# Patient Record
Sex: MALE | Race: WHITE | Employment: OTHER | URBAN - NONMETROPOLITAN AREA
[De-identification: names, ages, dates, MRNs, and addresses within clinical notes are randomized per-mention and may not be internally consistent; named-entity substitution may affect disease eponyms.]

---

## 2024-06-25 ENCOUNTER — HOSPITAL ENCOUNTER (INPATIENT)
Age: 24
LOS: 6 days | Discharge: HOME | DRG: 885 | End: 2024-07-01
Attending: PEDIATRICS | Admitting: PSYCHIATRY & NEUROLOGY
Payer: MEDICAID

## 2024-06-25 DIAGNOSIS — R45.851 SUICIDAL IDEATION: Primary | ICD-10-CM

## 2024-06-25 LAB
ALBUMIN SERPL-MCNC: 5.3 G/DL (ref 3.5–5.2)
ALP SERPL-CCNC: 74 U/L (ref 40–130)
ALT SERPL-CCNC: <5 U/L (ref 5–41)
AMPHET UR QL SCN: NEGATIVE
ANION GAP SERPL CALCULATED.3IONS-SCNC: 17 MMOL/L (ref 7–19)
AST SERPL-CCNC: 19 U/L (ref 5–40)
BARBITURATES UR QL SCN: NEGATIVE
BASOPHILS # BLD: 0 K/UL (ref 0–0.2)
BASOPHILS NFR BLD: 0.3 % (ref 0–1)
BENZODIAZ UR QL SCN: NEGATIVE
BILIRUB SERPL-MCNC: 0.9 MG/DL (ref 0.2–1.2)
BUN SERPL-MCNC: 12 MG/DL (ref 6–20)
BUPRENORPHINE URINE: NEGATIVE
CALCIUM SERPL-MCNC: 9.5 MG/DL (ref 8.6–10)
CANNABINOIDS UR QL SCN: NEGATIVE
CHLORIDE SERPL-SCNC: 101 MMOL/L (ref 98–111)
CO2 SERPL-SCNC: 22 MMOL/L (ref 22–29)
COCAINE UR QL SCN: NEGATIVE
CREAT SERPL-MCNC: 0.8 MG/DL (ref 0.5–1.2)
DRUG SCREEN COMMENT UR-IMP: NORMAL
EOSINOPHIL # BLD: 0 K/UL (ref 0–0.6)
EOSINOPHIL NFR BLD: 0 % (ref 0–5)
ERYTHROCYTE [DISTWIDTH] IN BLOOD BY AUTOMATED COUNT: 11.6 % (ref 11.5–14.5)
ETHANOLAMINE SERPL-MCNC: <10 MG/DL (ref 0–0.08)
FENTANYL SCREEN, URINE: NEGATIVE
GLUCOSE SERPL-MCNC: 92 MG/DL (ref 74–109)
HCT VFR BLD AUTO: 48.7 % (ref 42–52)
HGB BLD-MCNC: 16.8 G/DL (ref 14–18)
IMM GRANULOCYTES # BLD: 0 K/UL
LYMPHOCYTES # BLD: 0.9 K/UL (ref 1.1–4.5)
LYMPHOCYTES NFR BLD: 11 % (ref 20–40)
MCH RBC QN AUTO: 30.8 PG (ref 27–31)
MCHC RBC AUTO-ENTMCNC: 34.5 G/DL (ref 33–37)
MCV RBC AUTO: 89.2 FL (ref 80–94)
METHADONE UR QL SCN: NEGATIVE
METHAMPHETAMINE, URINE: NEGATIVE
MONOCYTES # BLD: 0.3 K/UL (ref 0–0.9)
MONOCYTES NFR BLD: 4.3 % (ref 0–10)
NEUTROPHILS # BLD: 6.6 K/UL (ref 1.5–7.5)
NEUTS SEG NFR BLD: 84.1 % (ref 50–65)
OPIATES UR QL SCN: NEGATIVE
OXYCODONE UR QL SCN: NEGATIVE
PCP UR QL SCN: NEGATIVE
PLATELET # BLD AUTO: 241 K/UL (ref 130–400)
PMV BLD AUTO: 9.7 FL (ref 9.4–12.4)
POTASSIUM SERPL-SCNC: 3.8 MMOL/L (ref 3.5–5)
PROT SERPL-MCNC: 8.1 G/DL (ref 6.6–8.7)
RBC # BLD AUTO: 5.46 M/UL (ref 4.7–6.1)
SARS-COV-2 RDRP RESP QL NAA+PROBE: NOT DETECTED
SODIUM SERPL-SCNC: 140 MMOL/L (ref 136–145)
TRICYCLIC ANTIDEPRESSANTS, UR: NEGATIVE
WBC # BLD AUTO: 7.9 K/UL (ref 4.8–10.8)

## 2024-06-25 PROCEDURE — 80053 COMPREHEN METABOLIC PANEL: CPT

## 2024-06-25 PROCEDURE — 36415 COLL VENOUS BLD VENIPUNCTURE: CPT

## 2024-06-25 PROCEDURE — 99285 EMERGENCY DEPT VISIT HI MDM: CPT

## 2024-06-25 PROCEDURE — 80307 DRUG TEST PRSMV CHEM ANLYZR: CPT

## 2024-06-25 PROCEDURE — 82077 ASSAY SPEC XCP UR&BREATH IA: CPT

## 2024-06-25 PROCEDURE — G0480 DRUG TEST DEF 1-7 CLASSES: HCPCS

## 2024-06-25 PROCEDURE — 87635 SARS-COV-2 COVID-19 AMP PRB: CPT

## 2024-06-25 PROCEDURE — 85025 COMPLETE CBC W/AUTO DIFF WBC: CPT

## 2024-06-25 PROCEDURE — 1240000000 HC EMOTIONAL WELLNESS R&B

## 2024-06-25 PROCEDURE — 6370000000 HC RX 637 (ALT 250 FOR IP): Performed by: PSYCHIATRY & NEUROLOGY

## 2024-06-25 RX ORDER — ACETAMINOPHEN 325 MG/1
650 TABLET ORAL EVERY 4 HOURS PRN
Status: DISCONTINUED | OUTPATIENT
Start: 2024-06-25 | End: 2024-07-01 | Stop reason: HOSPADM

## 2024-06-25 RX ORDER — TRAZODONE HYDROCHLORIDE 50 MG/1
50 TABLET ORAL NIGHTLY
Status: DISCONTINUED | OUTPATIENT
Start: 2024-06-25 | End: 2024-06-28

## 2024-06-25 RX ORDER — POLYETHYLENE GLYCOL 3350 17 G/17G
17 POWDER, FOR SOLUTION ORAL DAILY PRN
Status: DISCONTINUED | OUTPATIENT
Start: 2024-06-25 | End: 2024-07-01 | Stop reason: HOSPADM

## 2024-06-25 RX ORDER — MECOBALAMIN 5000 MCG
5 TABLET,DISINTEGRATING ORAL NIGHTLY
Status: DISCONTINUED | OUTPATIENT
Start: 2024-06-25 | End: 2024-06-29

## 2024-06-25 RX ORDER — HYDROXYZINE HYDROCHLORIDE 25 MG/1
25 TABLET, FILM COATED ORAL 3 TIMES DAILY PRN
Status: DISCONTINUED | OUTPATIENT
Start: 2024-06-25 | End: 2024-07-01 | Stop reason: HOSPADM

## 2024-06-25 RX ADMIN — Medication 5 MG: at 21:19

## 2024-06-25 RX ADMIN — HYDROXYZINE HYDROCHLORIDE 25 MG: 25 TABLET ORAL at 21:19

## 2024-06-25 SDOH — ECONOMIC STABILITY: FOOD INSECURITY: WITHIN THE PAST 12 MONTHS, YOU WORRIED THAT YOUR FOOD WOULD RUN OUT BEFORE YOU GOT MONEY TO BUY MORE.: NEVER TRUE

## 2024-06-25 SDOH — ECONOMIC STABILITY: INCOME INSECURITY: HOW HARD IS IT FOR YOU TO PAY FOR THE VERY BASICS LIKE FOOD, HOUSING, MEDICAL CARE, AND HEATING?: NOT HARD AT ALL

## 2024-06-25 SDOH — ECONOMIC STABILITY: INCOME INSECURITY: IN THE PAST 12 MONTHS, HAS THE ELECTRIC, GAS, OIL, OR WATER COMPANY THREATENED TO SHUT OFF SERVICE IN YOUR HOME?: NO

## 2024-06-25 ASSESSMENT — SLEEP AND FATIGUE QUESTIONNAIRES
AVERAGE NUMBER OF SLEEP HOURS: 9
DO YOU HAVE DIFFICULTY SLEEPING: NO
DO YOU USE A SLEEP AID: NO

## 2024-06-25 ASSESSMENT — PATIENT HEALTH QUESTIONNAIRE - PHQ9
SUM OF ALL RESPONSES TO PHQ QUESTIONS 1-9: 1
SUM OF ALL RESPONSES TO PHQ9 QUESTIONS 1 & 2: 1
SUM OF ALL RESPONSES TO PHQ QUESTIONS 1-9: 1
2. FEELING DOWN, DEPRESSED OR HOPELESS: SEVERAL DAYS
1. LITTLE INTEREST OR PLEASURE IN DOING THINGS: NOT AT ALL
SUM OF ALL RESPONSES TO PHQ QUESTIONS 1-9: 1
2. FEELING DOWN, DEPRESSED OR HOPELESS: SEVERAL DAYS
SUM OF ALL RESPONSES TO PHQ QUESTIONS 1-9: 1
SUM OF ALL RESPONSES TO PHQ9 QUESTIONS 1 & 2: 1
SUM OF ALL RESPONSES TO PHQ QUESTIONS 1-9: 1
SUM OF ALL RESPONSES TO PHQ9 QUESTIONS 1 & 2: 1
SUM OF ALL RESPONSES TO PHQ QUESTIONS 1-9: 1
SUM OF ALL RESPONSES TO PHQ QUESTIONS 1-9: 1
2. FEELING DOWN, DEPRESSED OR HOPELESS: SEVERAL DAYS
1. LITTLE INTEREST OR PLEASURE IN DOING THINGS: NOT AT ALL
1. LITTLE INTEREST OR PLEASURE IN DOING THINGS: NOT AT ALL
SUM OF ALL RESPONSES TO PHQ QUESTIONS 1-9: 1
SUM OF ALL RESPONSES TO PHQ QUESTIONS 1-9: 1

## 2024-06-25 ASSESSMENT — LIFESTYLE VARIABLES
HOW MANY STANDARD DRINKS CONTAINING ALCOHOL DO YOU HAVE ON A TYPICAL DAY: PATIENT DOES NOT DRINK
HOW OFTEN DO YOU HAVE A DRINK CONTAINING ALCOHOL: NEVER

## 2024-06-25 NOTE — ED NOTES
ED TO INPATIENT SBAR HANDOFF    Patient Name: Yves Haile   : 2000  23 y.o.   Family/Caregiver Present: Yes  Code Status Order: No Order    C-SSRS: Risk of Suicide: Low Risk  Sitter Yes  Restraints:         Situation  Chief Complaint:   Chief Complaint   Patient presents with    Suicidal     Spiritual struggle, suicidal thoughts 3 days ago     Patient Diagnosis: No admission diagnoses are documented for this encounter.     Brief Description of Patient's Condition:  PT states reason for ED visit, \"I've been suffering from anxiety and worry over the past few days. 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 I felt hot and flushed in my face all day.\" Pt reports suicidal thoughts \"sometimes\" and thoughts about his brother and making deals on his soul. Pt is religiously preoccupied and having paranoid delusions. Pt denies AVH. Pt denies sleeping difficulty and has had decreased appetite.Pt has been diagnosed with Autism. He started to see a therapist in [place] a few months ago and is suppose to be returning to [place] next week. Pt is on no medications at this time. Pt has never had any past suicide attempts. Pt has never been inpt before for mental health reasons.Pt has never seen a Psychiatrist. Pt denies any alcohol or drug use. Pt lives with his mother, step dad and brother in [place]. Pt is unemployed.   Per step mom he told her today he wanted to shoot himself. He struggles with his emotions. He thinks he does something bad all the time.The reason the pt came here was to visit and because  he was struggling at home with the family. Pt is suppose to return to [place] on . Step mom is concerned for his safety.     Mental Status: oriented, alert, coherent, logical, thought processes intact, and able to concentrate and follow conversation  Arrived from: home    Imaging:   No orders to display     COVID-19 Results:   Internal Administration   First Dose      Second Dose

## 2024-06-26 PROCEDURE — 1240000000 HC EMOTIONAL WELLNESS R&B

## 2024-06-26 PROCEDURE — 6370000000 HC RX 637 (ALT 250 FOR IP): Performed by: PSYCHIATRY & NEUROLOGY

## 2024-06-26 PROCEDURE — 90792 PSYCH DIAG EVAL W/MED SRVCS: CPT | Performed by: NURSE PRACTITIONER

## 2024-06-26 RX ADMIN — HYDROXYZINE HYDROCHLORIDE 25 MG: 25 TABLET ORAL at 20:55

## 2024-06-26 NOTE — ACP (ADVANCE CARE PLANNING)
Advance Care Planning     Advance Care Planning Inpatient Note  [facility] Department    Today's Date: 6/26/2024  Unit: [facility]ULT [facility]    Received request from IDT Member.  Upon review of chart and communication with care team, patient's decision making abilities are not in question.. Patient was/were present in the room during visit.    Goals of ACP Conversation:  Discuss advance care planning documents  Facilitate a discussion related to patient's goals of care as they align with the patient's values and beliefs.    Health Care Decision Makers:       Primary Decision Maker: KRZYSZTOF PIEDRA - [facility] - [phone]  Summary:  Documented Next of Kin, per patient report    Advance Care Planning Documents (Patient Wishes):  None     Assessment:  Patient stated who his support system; actually mentioned her step mother; still he understands what the State Law of [place] requires.  He appeared more calm and relaxed at the end of the conversation.      Interventions:  Provided education on documents for clarity and greater understanding  Discussed and provided education on state decision maker hierarchy    Care Preferences Communicated:   No    Outcomes/Plan:  Designation of Health Care Decision Maker based on [place] State Law    Electronically signed by MARLEEN Lynn on 6/26/2024 at 4:00 PM

## 2024-06-26 NOTE — PSYCHOTHERAPY
Group Therapy Note    Date: 6/26/2024  Start Time: 1330  End Time:  1400  Number of Participants: 10    Type of Group: SPIRITUALITY    Wellness Binder Information  Module Name:  Mindfulness  Session Number:      Patient's Goal:  To rest the mind    Notes:      Status After Intervention:  Improved    Participation Level: Active Listener    Participation Quality: Appropriate, Attentive, and Sharing      Speech:  normal      Thought Process/Content:       Affective Functioning: Congruent      Mood: Calm      Level of consciousness:  Attentive      Response to Learning: Able to verbalize current knowledge/experience, Able to verbalize/acknowledge new learning, and Capable of insight      Endings:     Modes of Intervention: Education, Socialization, and Activity      Discipline Responsible:       Signature:  Bruce Capone MA BCC

## 2024-06-26 NOTE — ED PROVIDER NOTES
[facility] 6 ADULT [facility]  eMERGENCY dEPARTMENT eNCOUnter      Pt Name: Yves Haile  MRN: [id number]  Birthdate 2000  Date of evaluation: 6/25/2024  Provider: Connie Hicks MD    CHIEF COMPLAINT       Chief Complaint   Patient presents with    Suicidal     Spiritual struggle, suicidal thoughts 3 days ago         HISTORY OF PRESENT ILLNESS   (Location/Symptom, Timing/Onset,Context/Setting, Quality, Duration, Modifying Factors, Severity)  Note limiting factors.   Yves Haile is a 23 y.o. male with history of autism who presents to the emergency department with recent suicidal thoughts.  Patient states he has been anxious and depressed.  He has been struggling \"spiritually.\"  Patient states that he began having suicidal thoughts \"a few days ago.\"  Patient states that \"I keep worrying about what might happen.\"  Patient denies tobacco use or drug use.  He occasionally uses alcohol.  Patient began seeing a therapist in [place] recently.  Patient is not on any medications.      HPI    NursingNotes were reviewed.    REVIEW OF SYSTEMS    (2-9 systems for level 4, 10 or more for level 5)     Review of Systems   Psychiatric/Behavioral:  Positive for suicidal ideas. Negative for hallucinations. The patient is nervous/anxious.    All other systems reviewed and are negative.           PAST MEDICALHISTORY   History reviewed. No pertinent past medical history.      SURGICAL HISTORY     History reviewed. No pertinent surgical history.      CURRENT MEDICATIONS   There are no discharge medications for this patient.      ALLERGIES     Patient has no known allergies.    FAMILY HISTORY     History reviewed. No pertinent family history.       SOCIAL HISTORY       Social History     Socioeconomic History    Marital status: Single     Spouse name: None    Number of children: None    Years of education: None    Highest education level: None   Tobacco Use    Smoking status: Never    Smokeless tobacco: Never   Substance and Sexual Activity

## 2024-06-26 NOTE — DISCHARGE INSTRUCTIONS
[facility]   Financial Resources*      Crisis Resources    [facility] Support [facility]  [address] [place], [place] [address]  [phone]  [facility]   [address], [place] [address]  Website: [email or web address]/  [phone]  [facility]   [address] [place], [place] [address]  Website: [email or web address].[email or web address]/  [phone]  [facility]  [address] [place], [place] [address]  Website: [email or web address]  [phone]  [facility]  [address] [place], [place] [address]  [phone]  [facility]  [address], [place] [address]  [phone]  [facility]  [address] [place], [place] [address]  [phone]    [facility] Family Support [facility]  [address] [address]  [phone] or [phone]  [facility]  [address] [place], [place] [address]  Website: [email or web address]/  [phone]  [facility]  [address] [place], [place] [address]  [phone]    [facility]  [address] [place], [place] [address]  Website: [email or web address]/  [phone]  [facility] Support [facility]  [address] [address]  [phone]    [facility]  [address] [place], [place] [address]  Website: [email or web address]/  [phone]  [facility]  [address] [place] [address]  [phone]    [facility]  [address] [place], [place] [address]  Website: [email or web address]/  [phone]  [facility] Support [facility]  [address] [place], [place] [address]  [phone]     [facility] Family Support Office  [address] [place] [address] [phone]  Christi

## 2024-06-27 LAB
25(OH)D3 SERPL-MCNC: 23.6 NG/ML
EKG P AXIS: 79 DEGREES
EKG P-R INTERVAL: 140 MS
EKG Q-T INTERVAL: 364 MS
EKG QRS DURATION: 92 MS
EKG QTC CALCULATION (BAZETT): 364 MS
EKG T AXIS: 46 DEGREES
TSH SERPL DL<=0.005 MIU/L-ACNC: 2.59 UIU/ML (ref 0.27–4.2)
VIT B12 SERPL-MCNC: 440 PG/ML (ref 211–946)

## 2024-06-27 PROCEDURE — 82607 VITAMIN B-12: CPT

## 2024-06-27 PROCEDURE — 6370000000 HC RX 637 (ALT 250 FOR IP): Performed by: NURSE PRACTITIONER

## 2024-06-27 PROCEDURE — 36415 COLL VENOUS BLD VENIPUNCTURE: CPT

## 2024-06-27 PROCEDURE — 84443 ASSAY THYROID STIM HORMONE: CPT

## 2024-06-27 PROCEDURE — 99232 SBSQ HOSP IP/OBS MODERATE 35: CPT | Performed by: NURSE PRACTITIONER

## 2024-06-27 PROCEDURE — 93005 ELECTROCARDIOGRAM TRACING: CPT

## 2024-06-27 PROCEDURE — 82306 VITAMIN D 25 HYDROXY: CPT

## 2024-06-27 PROCEDURE — 6370000000 HC RX 637 (ALT 250 FOR IP): Performed by: PSYCHIATRY & NEUROLOGY

## 2024-06-27 PROCEDURE — 1240000000 HC EMOTIONAL WELLNESS R&B

## 2024-06-27 RX ORDER — RISPERIDONE 1 MG/1
1 TABLET ORAL NIGHTLY
Status: DISCONTINUED | OUTPATIENT
Start: 2024-06-27 | End: 2024-07-01 | Stop reason: HOSPADM

## 2024-06-27 RX ADMIN — RISPERIDONE 1 MG: 1 TABLET, FILM COATED ORAL at 21:56

## 2024-06-27 RX ADMIN — TRAZODONE HYDROCHLORIDE 50 MG: 50 TABLET ORAL at 21:56

## 2024-06-27 RX ADMIN — Medication 5 MG: at 21:56

## 2024-06-27 NOTE — H&P
HISTORY and PHYSICAL      CHIEF COMPLAINT:  Anxiety, SI    Reason for Admission:  Anxiety, SI    History Obtained From:  patient, chart    HISTORY OF PRESENT ILLNESS:      The patient is a 23 y.o. male who is admitted to the [facility] unit with worsening mood issues. He has had no c/o chest pain or SOA. He has had no abdominal pain or N/V. He has had no dysuria. He has had no new pain issues. He has had no HA or dizziness. He has had no fevers.    Past Medical History:    History reviewed. No pertinent past medical history.  Past Surgical History:    History reviewed. No pertinent surgical history.      Medications Prior to Admission:    No medications prior to admission.    Allergies:  Patient has no known allergies.    Social History:   TOBACCO:   reports that he has never smoked. He has never used smokeless tobacco.  ETOH:   reports no history of alcohol use.  DRUGS:   reports no history of drug use.  MARITAL STATUS:  not   OCCUPATION:  not working  Patient currently lives with family       Family History:   History reviewed. No pertinent family history.  REVIEW OF SYSTEMS:  Constitutional: neg  CV: neg  Pulmonary: neg  GI: neg  : neg  Psych: anxiety, SI  Neuro: neg  Skin: neg  MusculoSkeletal: neg  HEENT: neg  Joints: neg    Vitals:  /75   Pulse 76   Temp 98.2 °F (36.8 °C)   Resp 14   Ht 1.803 m (5' 11\")   Wt 60.1 kg (132 lb 6.4 oz)   SpO2 98%   BMI 18.47 kg/m²     PHYSICAL EXAM:  Gen: NAD, alert  HEENT: WNL  Lymph: no LAD  Neck: no JVD or masses  Chest: CTA bilat  CV: RRR  Abdomen: NT/ND  Extrem: no C/C/E  Neuro: non focal  Skin: no rashes  Joints: no redness    DATA:  I have reviewed the admission labs and imaging tests.    ASSESSMENT AND PLAN:      Principal Problem:    Suicidal ideation---follow with Psych    Autism          Jess Manning MD  7:58 AM 6/27/2024  
      Behavioral Services  Medicare Certification Upon Admission    I certify that this patient's inpatient psychiatric hospital admission is medically necessary for:    [x] (1) Treatment which could reasonably be expected to improve this patient's condition,       [x] (2) Or for diagnostic study;     AND     [x](2) The inpatient psychiatric services are provided while the individual is under the care of a physician and are included in the individualized plan of care.    Estimated length of stay/service :  3 days-5 days    Plan for post-hospital care : TBD    Electronically signed by TERE Vickers on 6/26/2024 at 9:10 AM      
wheezing  Cardiovascular ROS: no chest pain or dyspnea on exertion  Gastrointestinal ROS: no abdominal pain, change in bowel habits, or black or bloody stools  Genito-Urinary ROS: no dysuria, trouble voiding, or hematuria  Musculoskeletal ROS: negative  Neurological ROS: no TIA or stroke symptoms, CN II-XII, No abnormal movements or tremors   Dermatological ROS: negative      DSM V Diagnoses:    Unspecified mood disorder  Autism   R/O OCD  Anxiety, unspecified       Recommendations:  1. Admit to [facility] and monitor on 15 min checks  2. Wale to be reviewed.  3. Collateral information from family with release  4. Medical monitoring by Dr Manning and associates  5. Acclimate to the unit and encourage group attendance   6. Legal Status: Voluntary  7. Precautions: Suicide  8. Diet: Regular  9. Patient reports he does not want to start medication yet without talking to his parents.   10. Disposition: social work consulted    11. Hydroxyzine 25 mg po tid as needed for anxiety- He was educated on risks, benefits and possible side effects including but not limited to; dry mouth, sedation, tremor, bronchodilation and respiratory depression.   12. HGBA1C  13. LIPID PANEL      Carmenza Blackburn, PMHNP-BC, FNP-C

## 2024-06-27 NOTE — BH NOTE
Group Therapy Note    Date: 06/27/24  Start Time: 0800  End Time:0815    Number of Participants: 10    Type of Group: self inventory    Patient's Goal:  \"Not focusing on the thoughts that are causing my anxiety\".    Notes:      Status After Intervention:  Improved    Participation Level: Active Listener and Interactive    Participation Quality: Appropriate    Speech:  normal    Thought Process/Content: Logical    Affective Functioning: Congruent    Mood: anxious    Level of consciousness:  Alert and Oriented x4    Response to Learning: Progressing to goal    Modes of Intervention: Education and Support    Discipline Responsible: Registered Nurse    Signature: Ez Joiner RN

## 2024-06-28 PROBLEM — F29 PSYCHOSIS (HCC): Status: ACTIVE | Noted: 2024-06-28

## 2024-06-28 PROBLEM — F84.0 AUTISM: Status: ACTIVE | Noted: 2024-06-28

## 2024-06-28 PROCEDURE — 6370000000 HC RX 637 (ALT 250 FOR IP): Performed by: NURSE PRACTITIONER

## 2024-06-28 PROCEDURE — 1240000000 HC EMOTIONAL WELLNESS R&B

## 2024-06-28 PROCEDURE — 6370000000 HC RX 637 (ALT 250 FOR IP): Performed by: PSYCHIATRY & NEUROLOGY

## 2024-06-28 PROCEDURE — 99232 SBSQ HOSP IP/OBS MODERATE 35: CPT | Performed by: NURSE PRACTITIONER

## 2024-06-28 RX ORDER — TRAZODONE HYDROCHLORIDE 50 MG/1
50 TABLET ORAL NIGHTLY PRN
Status: DISCONTINUED | OUTPATIENT
Start: 2024-06-28 | End: 2024-07-01 | Stop reason: HOSPADM

## 2024-06-28 RX ADMIN — ACETAMINOPHEN 650 MG: 325 TABLET ORAL at 22:14

## 2024-06-28 RX ADMIN — RISPERIDONE 1 MG: 1 TABLET, FILM COATED ORAL at 21:36

## 2024-06-28 RX ADMIN — Medication 5 MG: at 21:36

## 2024-06-28 RX ADMIN — ACETAMINOPHEN 650 MG: 325 TABLET ORAL at 17:03

## 2024-06-28 RX ADMIN — HYDROXYZINE HYDROCHLORIDE 25 MG: 25 TABLET ORAL at 14:46

## 2024-06-28 RX ADMIN — TRAZODONE HYDROCHLORIDE 50 MG: 50 TABLET ORAL at 21:36

## 2024-06-28 RX ADMIN — HYDROXYZINE HYDROCHLORIDE 25 MG: 25 TABLET ORAL at 21:35

## 2024-06-28 ASSESSMENT — LIFESTYLE VARIABLES
HOW OFTEN DO YOU HAVE A DRINK CONTAINING ALCOHOL: NEVER
HOW MANY STANDARD DRINKS CONTAINING ALCOHOL DO YOU HAVE ON A TYPICAL DAY: PATIENT DOES NOT DRINK

## 2024-06-28 ASSESSMENT — PAIN SCALES - GENERAL
PAINLEVEL_OUTOF10: 0
PAINLEVEL_OUTOF10: 4

## 2024-06-28 ASSESSMENT — PAIN DESCRIPTION - LOCATION: LOCATION: HEAD

## 2024-06-28 ASSESSMENT — PAIN DESCRIPTION - DESCRIPTORS: DESCRIPTORS: ACHING

## 2024-06-28 ASSESSMENT — PAIN - FUNCTIONAL ASSESSMENT: PAIN_FUNCTIONAL_ASSESSMENT: ACTIVITIES ARE NOT PREVENTED

## 2024-06-28 ASSESSMENT — PAIN DESCRIPTION - ORIENTATION: ORIENTATION: RIGHT;LEFT;MID

## 2024-06-29 LAB
CHOLEST SERPL-MCNC: 160 MG/DL (ref 160–199)
HBA1C MFR BLD: 4.8 % (ref 4–6)
HDLC SERPL-MCNC: 47 MG/DL (ref 55–121)
LDLC SERPL CALC-MCNC: 79 MG/DL
TRIGL SERPL-MCNC: 171 MG/DL (ref 0–149)

## 2024-06-29 PROCEDURE — 99232 SBSQ HOSP IP/OBS MODERATE 35: CPT | Performed by: PSYCHIATRY & NEUROLOGY

## 2024-06-29 PROCEDURE — 6370000000 HC RX 637 (ALT 250 FOR IP): Performed by: PSYCHIATRY & NEUROLOGY

## 2024-06-29 PROCEDURE — 83036 HEMOGLOBIN GLYCOSYLATED A1C: CPT

## 2024-06-29 PROCEDURE — 80061 LIPID PANEL: CPT

## 2024-06-29 PROCEDURE — 1240000000 HC EMOTIONAL WELLNESS R&B

## 2024-06-29 PROCEDURE — 36415 COLL VENOUS BLD VENIPUNCTURE: CPT

## 2024-06-29 PROCEDURE — 6370000000 HC RX 637 (ALT 250 FOR IP): Performed by: NURSE PRACTITIONER

## 2024-06-29 RX ORDER — FLUTICASONE PROPIONATE 50 MCG
2 SPRAY, SUSPENSION (ML) NASAL 2 TIMES DAILY PRN
Status: DISCONTINUED | OUTPATIENT
Start: 2024-06-29 | End: 2024-07-01 | Stop reason: HOSPADM

## 2024-06-29 RX ORDER — MECOBALAMIN 5000 MCG
5 TABLET,DISINTEGRATING ORAL NIGHTLY PRN
Status: DISCONTINUED | OUTPATIENT
Start: 2024-06-29 | End: 2024-07-01 | Stop reason: HOSPADM

## 2024-06-29 RX ADMIN — RISPERIDONE 1 MG: 1 TABLET, FILM COATED ORAL at 21:31

## 2024-06-29 RX ADMIN — ACETAMINOPHEN 650 MG: 325 TABLET ORAL at 15:14

## 2024-06-29 RX ADMIN — Medication 5 MG: at 21:24

## 2024-06-29 RX ADMIN — HYDROXYZINE HYDROCHLORIDE 25 MG: 25 TABLET ORAL at 08:36

## 2024-06-29 RX ADMIN — HYDROXYZINE HYDROCHLORIDE 25 MG: 25 TABLET ORAL at 21:24

## 2024-06-29 RX ADMIN — TRAZODONE HYDROCHLORIDE 50 MG: 50 TABLET ORAL at 21:24

## 2024-06-29 ASSESSMENT — PAIN - FUNCTIONAL ASSESSMENT: PAIN_FUNCTIONAL_ASSESSMENT: ACTIVITIES ARE NOT PREVENTED

## 2024-06-29 ASSESSMENT — PAIN DESCRIPTION - LOCATION: LOCATION: HEAD

## 2024-06-29 ASSESSMENT — PAIN DESCRIPTION - DESCRIPTORS: DESCRIPTORS: ACHING

## 2024-06-29 ASSESSMENT — PAIN DESCRIPTION - ORIENTATION: ORIENTATION: RIGHT;LEFT

## 2024-06-29 ASSESSMENT — PAIN SCALES - GENERAL: PAINLEVEL_OUTOF10: 3

## 2024-06-30 LAB
ALBUMIN SERPL-MCNC: 4.4 G/DL (ref 3.5–5.2)
ALP SERPL-CCNC: 64 U/L (ref 40–130)
ALT SERPL-CCNC: 8 U/L (ref 5–41)
ANION GAP SERPL CALCULATED.3IONS-SCNC: 11 MMOL/L (ref 7–19)
AST SERPL-CCNC: 11 U/L (ref 5–40)
BASOPHILS # BLD: 0 K/UL (ref 0–0.2)
BASOPHILS NFR BLD: 0.3 % (ref 0–1)
BILIRUB SERPL-MCNC: 0.4 MG/DL (ref 0.2–1.2)
BUN SERPL-MCNC: 13 MG/DL (ref 6–20)
CALCIUM SERPL-MCNC: 8.9 MG/DL (ref 8.6–10)
CHLORIDE SERPL-SCNC: 103 MMOL/L (ref 98–111)
CO2 SERPL-SCNC: 25 MMOL/L (ref 22–29)
CREAT SERPL-MCNC: 0.7 MG/DL (ref 0.5–1.2)
EOSINOPHIL # BLD: 0.1 K/UL (ref 0–0.6)
EOSINOPHIL NFR BLD: 0.5 % (ref 0–5)
ERYTHROCYTE [DISTWIDTH] IN BLOOD BY AUTOMATED COUNT: 11.9 % (ref 11.5–14.5)
GLUCOSE SERPL-MCNC: 98 MG/DL (ref 74–109)
HCT VFR BLD AUTO: 43.6 % (ref 42–52)
HGB BLD-MCNC: 15.2 G/DL (ref 14–18)
IMM GRANULOCYTES # BLD: 0 K/UL
LYMPHOCYTES # BLD: 1.4 K/UL (ref 1.1–4.5)
LYMPHOCYTES NFR BLD: 14 % (ref 20–40)
MCH RBC QN AUTO: 30.7 PG (ref 27–31)
MCHC RBC AUTO-ENTMCNC: 34.9 G/DL (ref 33–37)
MCV RBC AUTO: 88.1 FL (ref 80–94)
MONOCYTES # BLD: 0.9 K/UL (ref 0–0.9)
MONOCYTES NFR BLD: 8.6 % (ref 0–10)
NEUTROPHILS # BLD: 7.5 K/UL (ref 1.5–7.5)
NEUTS SEG NFR BLD: 76.3 % (ref 50–65)
PLATELET # BLD AUTO: 184 K/UL (ref 130–400)
PMV BLD AUTO: 10.1 FL (ref 9.4–12.4)
POTASSIUM SERPL-SCNC: 4 MMOL/L (ref 3.5–5)
PROT SERPL-MCNC: 6.5 G/DL (ref 6.6–8.7)
RBC # BLD AUTO: 4.95 M/UL (ref 4.7–6.1)
SODIUM SERPL-SCNC: 139 MMOL/L (ref 136–145)
WBC # BLD AUTO: 9.9 K/UL (ref 4.8–10.8)

## 2024-06-30 PROCEDURE — 6370000000 HC RX 637 (ALT 250 FOR IP): Performed by: NURSE PRACTITIONER

## 2024-06-30 PROCEDURE — 6370000000 HC RX 637 (ALT 250 FOR IP): Performed by: HOSPITALIST

## 2024-06-30 PROCEDURE — 36415 COLL VENOUS BLD VENIPUNCTURE: CPT

## 2024-06-30 PROCEDURE — 80053 COMPREHEN METABOLIC PANEL: CPT

## 2024-06-30 PROCEDURE — 85025 COMPLETE CBC W/AUTO DIFF WBC: CPT

## 2024-06-30 PROCEDURE — 1240000000 HC EMOTIONAL WELLNESS R&B

## 2024-06-30 PROCEDURE — 6370000000 HC RX 637 (ALT 250 FOR IP): Performed by: PSYCHIATRY & NEUROLOGY

## 2024-06-30 RX ADMIN — BENZOCAINE 6 MG-MENTHOL 10 MG LOZENGES 1 LOZENGE: at 20:49

## 2024-06-30 RX ADMIN — HYDROXYZINE HYDROCHLORIDE 25 MG: 25 TABLET ORAL at 20:50

## 2024-06-30 RX ADMIN — BENZOCAINE 6 MG-MENTHOL 10 MG LOZENGES 1 LOZENGE: at 10:19

## 2024-06-30 RX ADMIN — Medication 5 MG: at 20:50

## 2024-06-30 RX ADMIN — TRAZODONE HYDROCHLORIDE 50 MG: 50 TABLET ORAL at 20:50

## 2024-06-30 RX ADMIN — BENZOCAINE 6 MG-MENTHOL 10 MG LOZENGES 1 LOZENGE: at 07:53

## 2024-06-30 RX ADMIN — BENZOCAINE 6 MG-MENTHOL 10 MG LOZENGES 1 LOZENGE: at 12:02

## 2024-06-30 RX ADMIN — BENZOCAINE 6 MG-MENTHOL 10 MG LOZENGES 1 LOZENGE: at 14:28

## 2024-06-30 RX ADMIN — RISPERIDONE 1 MG: 1 TABLET, FILM COATED ORAL at 20:50

## 2024-06-30 RX ADMIN — BENZOCAINE 6 MG-MENTHOL 10 MG LOZENGES 1 LOZENGE: at 16:06

## 2024-06-30 ASSESSMENT — PAIN DESCRIPTION - DESCRIPTORS
DESCRIPTORS: ACHING
DESCRIPTORS: SORE
DESCRIPTORS: ACHING

## 2024-06-30 ASSESSMENT — PAIN - FUNCTIONAL ASSESSMENT
PAIN_FUNCTIONAL_ASSESSMENT: ACTIVITIES ARE NOT PREVENTED

## 2024-06-30 ASSESSMENT — PAIN SCALES - GENERAL
PAINLEVEL_OUTOF10: 0
PAINLEVEL_OUTOF10: 2
PAINLEVEL_OUTOF10: 3
PAINLEVEL_OUTOF10: 5
PAINLEVEL_OUTOF10: 1
PAINLEVEL_OUTOF10: 2
PAINLEVEL_OUTOF10: 0

## 2024-06-30 ASSESSMENT — PAIN DESCRIPTION - LOCATION
LOCATION: THROAT

## 2024-06-30 ASSESSMENT — PAIN DESCRIPTION - ORIENTATION: ORIENTATION: INNER

## 2024-07-01 VITALS
SYSTOLIC BLOOD PRESSURE: 112 MMHG | HEART RATE: 98 BPM | TEMPERATURE: 97.7 F | OXYGEN SATURATION: 96 % | WEIGHT: 132.4 LBS | DIASTOLIC BLOOD PRESSURE: 73 MMHG | RESPIRATION RATE: 18 BRPM | HEIGHT: 71 IN | BODY MASS INDEX: 18.54 KG/M2

## 2024-07-01 PROCEDURE — 99239 HOSP IP/OBS DSCHRG MGMT >30: CPT | Performed by: NURSE PRACTITIONER

## 2024-07-01 PROCEDURE — 5130000000 HC BRIDGE APPOINTMENT

## 2024-07-01 RX ORDER — HYDROXYZINE HYDROCHLORIDE 25 MG/1
25 TABLET, FILM COATED ORAL 3 TIMES DAILY PRN
Qty: 30 TABLET | Refills: 0 | Status: SHIPPED | OUTPATIENT
Start: 2024-07-01 | End: 2024-07-11

## 2024-07-01 RX ORDER — FLUTICASONE PROPIONATE 50 MCG
2 SPRAY, SUSPENSION (ML) NASAL 2 TIMES DAILY PRN
Qty: 16 G | Refills: 0 | Status: SHIPPED | OUTPATIENT
Start: 2024-07-01

## 2024-07-01 RX ORDER — RISPERIDONE 1 MG/1
1 TABLET ORAL NIGHTLY
Qty: 30 TABLET | Refills: 0 | Status: SHIPPED | OUTPATIENT
Start: 2024-07-01

## 2024-07-01 RX ADMIN — BENZOCAINE 6 MG-MENTHOL 10 MG LOZENGES 1 LOZENGE: at 08:35

## 2024-07-01 RX ADMIN — BENZOCAINE 6 MG-MENTHOL 10 MG LOZENGES 1 LOZENGE: at 05:44

## 2024-07-01 ASSESSMENT — PAIN - FUNCTIONAL ASSESSMENT
PAIN_FUNCTIONAL_ASSESSMENT: ACTIVITIES ARE NOT PREVENTED
PAIN_FUNCTIONAL_ASSESSMENT: ACTIVITIES ARE NOT PREVENTED

## 2024-07-01 ASSESSMENT — PAIN DESCRIPTION - DESCRIPTORS
DESCRIPTORS: ACHING
DESCRIPTORS: SORE

## 2024-07-01 ASSESSMENT — PAIN DESCRIPTION - LOCATION
LOCATION: THROAT
LOCATION: THROAT

## 2024-07-01 ASSESSMENT — PAIN SCALES - GENERAL
PAINLEVEL_OUTOF10: 3
PAINLEVEL_OUTOF10: 2

## 2024-07-01 ASSESSMENT — PAIN DESCRIPTION - ORIENTATION: ORIENTATION: RIGHT;LEFT

## 2024-07-01 NOTE — DISCHARGE SUMMARY
Discharge Summary     Patient ID:  Yves Haile  [id number]  23 y.o.  2000    Admit date: 6/25/2024  Discharge date: 7/1/2024    Admitting Physician: Chioma Faust MD   Attending Physician: No att. providers found  Discharge Provider: TERE Vickers     Admission Diagnoses: Suicidal ideation [R45.851]    Discharge Diagnoses:   Unspecified mood disorder  Autism   R/O OCD  Anxiety, unspecified     Admission Condition: fair    Discharged Condition: good    Indication for Admission: psychosis    HPI:    Patient is a 24 y/o c/m who presented with suicidal ideations. UDS negative. BAL negative. He denies any prior inpatient psychiatric hospitalizations or prior suicide attempts. Medical history includes Autism.      Today he reports that he hs been struggling with intrusive thoughts of \"evil forces or the enemy (satan).\" He reports he will have a thought  that if he does something that one of his family members soul will be taken. He gives an example of his baby brother crying and the patent wanting him to stop so he says he will make a deal with the enemy (the devil) to get him to stop crying. He reports that he has struggled with this since late 2020. He has not told his family he has been struggling with this. He denies that they are commanding voices he is hearing. He states, \"I feel like I can control the thoughts most of the time.\" He states, \"I feel that sometimes the enemy (the devil) will try to get to us when we are feeling weak.\" He endorses feeling anxious when he has the intrusive thoughts. He endorses having a suicidal thought on Sunday and had the image of shotgun being in his mouth. He reports on Saturday he had shot a gun for the first time learning how to skeet shoot. He thinks his suicidal thought was about a gun because he had used a gun for the first time on Saturday. He reports that he did have a thought about \"the enemy\" when he was shooting skeet. He reports the thought was that if he

## 2024-07-01 NOTE — DISCHARGE INSTR - DIET

## 2024-07-01 NOTE — PLAN OF CARE
Group Therapy Note    Date: 7/1/2024  Start Time: 1100  End Time:  1130  Number of Participants: 11    Type of Group: Healthy Living/Wellness    Wellness Binder Information  Module Name:  Women's Issues  Session Number:  1    Group Goal for Pt:  To raise awareness of the effectiveness of assertiveness    Notes:  Pt did not attend group discussion.  Pt was invited/encouraged.    Status After Intervention:      Participation Level:     Participation Quality:       Speech:        Thought Process/Content:       Affective Functioning:       Mood:       Level of consciousness:        Response to Learning:       Endings:     Modes of Intervention:       Discipline Responsible:       Signature:  Miguelina Garcia    
  Problem: Anxiety  Goal: Will report anxiety at manageable levels  Outcome: Progressing      Group Therapy Note     Date: 6/26/2024  Start Time: 1100  End Time:  1130  Number of Participants: 10     Type of Group: Psychoeducation     Wellness Binder Information  Module Name:  emotional wellness  Session Number:  5     Patient's Goal:  obstacles to emotional wellness     Notes:  pt acknowledged negative thinking as an obstacle to emotional wellness.     Status After Intervention:  Improved     Participation Level: Interactive     Participation Quality: Appropriate, Attentive, and Sharing        Speech:  normal        Thought Process/Content: Logical        Affective Functioning: Congruent        Mood: congruent        Level of consciousness:  Alert, Oriented x4, and Attentive        Response to Learning: Able to verbalize current knowledge/experience        Endings: None Reported     Modes of Intervention: Education        Discipline Responsible: Psychoeducational Specialist        Signature:  Mireille Villa                    
  Problem: Anxiety  Goal: Will report anxiety at manageable levels  Outcome: Progressing      Group Therapy Note     Date: 6/28/2024  Start Time: 1100  End Time:  1130  Number of Participants: 8     Type of Group: Psychoeducation     Wellness Binder Information  Module Name:  emotional wellness  Session Number:  5     Patient's Goal:  obstacles to emotional wellness     Notes:  pt acknowledged negative thinking as an obstacle to emotional wellness.     Status After Intervention:  Improved     Participation Level: Interactive     Participation Quality: Appropriate, Attentive, and Sharing        Speech:  normal        Thought Process/Content: Logical        Affective Functioning: Congruent        Mood: congruent        Level of consciousness:  Alert, Oriented x4, and Attentive        Response to Learning: Able to verbalize current knowledge/experience        Endings: None Reported     Modes of Intervention: Education        Discipline Responsible: Psychoeducational Specialist        Signature:  Mireille Villa                    
  Problem: Coping  Goal: Pt/Family able to verbalize concerns and demonstrate effective coping strategies  Description: INTERVENTIONS:  1. Assist patient/family to identify coping skills, available support systems and cultural and spiritual values  2. Provide emotional support, including active listening and acknowledgement of concerns of patient and caregivers  3. Reduce environmental stimuli, as able  4. Instruct patient/family in relaxation techniques, as appropriate  5. Assess for spiritual pain/suffering and initiate Spiritual Care, Psychosocial Clinical Specialist consults as needed  6/27/2024 1240 by Miguelina Garcia  Outcome: Progressing  Note:                                                                     Group Therapy Note    Date: 6/27/2024  Start Time: 1100  End Time:  1130  Number of Participants: 9    Type of Group: Healthy Living/Wellness    Wellness Binder Information  Module Name:  Stress  Session Number:  5    Group Goal for Pt:  To raise awareness of the effectiveness of diversionary coping skills    Notes:  Pt demonstrated improved awareness of the effectiveness of diversionary coping skills by actively participating in group activity.    Status After Intervention:  Unchanged    Participation Level: Active Listener    Participation Quality: Appropriate      Speech:  normal      Thought Process/Content: Logical      Affective Functioning: Congruent      Mood: anxious and depressed      Level of consciousness:  Alert and Oriented x4      Response to Learning: Able to verbalize current knowledge/experience, Able to verbalize/acknowledge new learning, and Progressing to goal      Endings: None Reported    Modes of Intervention: Education      Discipline Responsible: Psychoeducational Specialist      Signature:  Miguelina Garcia       
  Problem: Coping  Goal: Pt/Family able to verbalize concerns and demonstrate effective coping strategies  Description: INTERVENTIONS:  1. Assist patient/family to identify coping skills, available support systems and cultural and spiritual values  2. Provide emotional support, including active listening and acknowledgement of concerns of patient and caregivers  3. Reduce environmental stimuli, as able  4. Instruct patient/family in relaxation techniques, as appropriate  5. Assess for spiritual pain/suffering and initiate Spiritual Care, Psychosocial Clinical Specialist consults as needed  Outcome: Progressing  Note:                                                                     Group Therapy Note    Date: 6/27/2024  Start Time: 1000  End Time:  1030  Number of Participants: 11    Type of Group: Psychoeducation    Wellness Binder Information  Module Name:  Women's Issues  Session Number:  4    Group Goal for Pt:  To raise awareness of how thoughts influence feelings    Notes:  Pt demonstrated improved awareness of how thoughts influence feelings by actively participating in group discussion.    Status After Intervention:  Unchanged    Participation Level: Active Listener    Participation Quality: Appropriate      Speech:  normal      Thought Process/Content: Logical      Affective Functioning: Congruent      Mood: anxious and depressed      Level of consciousness:  Alert and Oriented x4      Response to Learning: Able to verbalize current knowledge/experience, Able to verbalize/acknowledge new learning, and Progressing to goal      Endings: None Reported    Modes of Intervention: Education      Discipline Responsible: Psychoeducational Specialist      Signature:  Miguelina Garcia       
  Problem: Coping  Goal: Pt/Family able to verbalize concerns and demonstrate effective coping strategies  Description: INTERVENTIONS:  1. Assist patient/family to identify coping skills, available support systems and cultural and spiritual values  2. Provide emotional support, including active listening and acknowledgement of concerns of patient and caregivers  3. Reduce environmental stimuli, as able  4. Instruct patient/family in relaxation techniques, as appropriate  5. Assess for spiritual pain/suffering and initiate Spiritual Care, Psychosocial Clinical Specialist consults as needed  Outcome: Progressing  Note:                                                                     Group Therapy Note    Date: 7/1/2024  Start Time: 1000  End Time:  1030  Number of Participants: 15    Type of Group: Psychoeducation    Wellness Binder Information  Module Name:  Relapse Prevention  Session Number:  5    Group Goal for Pt:  To improve knowledge of relapse prevention strategies    Notes:  Pt demonstrated improved knowledge of relapse prevention strategies by actively participating in group discussion.    Status After Intervention:  Unchanged    Participation Level: Active Listener    Participation Quality: Appropriate      Speech:  normal      Thought Process/Content: Logical      Affective Functioning: Congruent      Mood: anxious and depressed      Level of consciousness:  Alert and Oriented x4      Response to Learning: Able to verbalize current knowledge/experience, Able to verbalize/acknowledge new learning, and Progressing to goal      Endings: None Reported    Modes of Intervention: Education      Discipline Responsible: Psychoeducational Specialist      Signature:  Miguelina Garcia       
  Problem: Psychosis  Goal: Will report no hallucinations or delusions  Description: INTERVENTIONS:  1. Administer medication as  ordered  2. Assist with reality testing to support increasing orientation  3. Assess if patient's hallucinations or delusions are encouraging self harm or harm to others and intervene as appropriate  6/30/2024 1626 by Rajeev Bella RN  Outcome: Progressing     Problem: Psychosis  Goal: Will report no hallucinations or delusions  Description: INTERVENTIONS:  1. Administer medication as  ordered  2. Assist with reality testing to support increasing orientation  3. Assess if patient's hallucinations or delusions are encouraging self harm or harm to others and intervene as appropriate  6/30/2024 1623 by Rajeev Bella RN  Outcome: Progressing     Problem: Anxiety  Goal: Will report anxiety at manageable levels  Description: INTERVENTIONS:  1. Administer medication as ordered  2. Teach and rehearse alternative coping skills  3. Provide emotional support with 1:1 interaction with staff  6/30/2024 1626 by Rajeev Bella RN  Outcome: Progressing  6/30/2024 1623 by Rajeev Bella RN  Outcome: Progressing  Flowsheets (Taken 6/30/2024 1603)  Will report anxiety at manageable levels:   Teach and rehearse alternative coping skills   Provide emotional support with 1:1 interaction with staff   Administer medication as ordered     Problem: Spiritual Care  Goal: Pt/Family able to move forward in process of forgiving self, others, and/or higher power  Description: INTERVENTIONS:  1. Assist patient/family to overcome blocks to healing by use of spiritual practices (prayer, meditation, guided imagery, reiki, breath work, etc).  2. De-myth guilt and help patient/family identify possible irrational spiritual/cultural beliefs and values.  3. Explore possibilities of making amends & reconciliation with self, others, and/or a greater power.  4. Guide patient/family in identifying painful feelings of 
  Problem: Self Harm/Suicidality  Goal: Will have no self-injury during hospital stay  Description: INTERVENTIONS:  1.  Ensure constant observer at bedside with Q15M safety checks  2.  Maintain a safe environment  3.  Secure patient belongings  4.  Ensure family/visitors adhere to safety recommendations  5.  Ensure safety tray has been added to patient's diet order  6.  Every shift and PRN: Re-assess suicidal risk via Frequent Screener    Outcome: Adequate for Discharge     Problem: Depression  Goal: Will be euthymic at discharge  Description: INTERVENTIONS:  1. Administer medication as ordered  2. Provide emotional support via 1:1 interaction with staff  3. Encourage involvement in milieu/groups/activities  4. Monitor for social isolation  Outcome: Adequate for Discharge     Problem: Psychosis  Goal: Will report no hallucinations or delusions  Description: INTERVENTIONS:  1. Administer medication as  ordered  2. Assist with reality testing to support increasing orientation  3. Assess if patient's hallucinations or delusions are encouraging self harm or harm to others and intervene as appropriate  Outcome: Adequate for Discharge     Problem: Anxiety  Goal: Will report anxiety at manageable levels  Description: INTERVENTIONS:  1. Administer medication as ordered  2. Teach and rehearse alternative coping skills  3. Provide emotional support with 1:1 interaction with staff  Outcome: Adequate for Discharge     Problem: Spiritual Care  Goal: Pt/Family able to move forward in process of forgiving self, others, and/or higher power  Description: INTERVENTIONS:  1. Assist patient/family to overcome blocks to healing by use of spiritual practices (prayer, meditation, guided imagery, reiki, breath work, etc).  2. De-myth guilt and help patient/family identify possible irrational spiritual/cultural beliefs and values.  3. Explore possibilities of making amends & reconciliation with self, others, and/or a greater power.  4. 
  Problem: Self Harm/Suicidality  Goal: Will have no self-injury during hospital stay  Description: INTERVENTIONS:  1.  Ensure constant observer at bedside with Q15M safety checks  2.  Maintain a safe environment  3.  Secure patient belongings  4.  Ensure family/visitors adhere to safety recommendations  5.  Ensure safety tray has been added to patient's diet order  6.  Every shift and PRN: Re-assess suicidal risk via Frequent Screener    Outcome: Progressing     Problem: Depression  Goal: Will be euthymic at discharge  Description: INTERVENTIONS:  1. Administer medication as ordered  2. Provide emotional support via 1:1 interaction with staff  3. Encourage involvement in milieu/groups/activities  4. Monitor for social isolation  Outcome: Progressing     Problem: Psychosis  Goal: Will report no hallucinations or delusions  Description: INTERVENTIONS:  1. Administer medication as  ordered  2. Assist with reality testing to support increasing orientation  3. Assess if patient's hallucinations or delusions are encouraging self harm or harm to others and intervene as appropriate  Outcome: Progressing     Problem: Anxiety  Goal: Will report anxiety at manageable levels  Description: INTERVENTIONS:  1. Administer medication as ordered  2. Teach and rehearse alternative coping skills  3. Provide emotional support with 1:1 interaction with staff  6/28/2024 1609 by Kayley Whitehead RN  Outcome: Progressing  6/28/2024 1139 by Mireille Villa  Outcome: Progressing     Problem: Spiritual Care  Goal: Pt/Family able to move forward in process of forgiving self, others, and/or higher power  Description: INTERVENTIONS:  1. Assist patient/family to overcome blocks to healing by use of spiritual practices (prayer, meditation, guided imagery, reiki, breath work, etc).  2. De-myth guilt and help patient/family identify possible irrational spiritual/cultural beliefs and values.  3. Explore possibilities of making amends & reconciliation 
  Problem: Self Harm/Suicidality  Goal: Will have no self-injury during hospital stay  Description: INTERVENTIONS:  1.  Ensure constant observer at bedside with Q15M safety checks  2.  Maintain a safe environment  3.  Secure patient belongings  4.  Ensure family/visitors adhere to safety recommendations  5.  Ensure safety tray has been added to patient's diet order  6.  Every shift and PRN: Re-assess suicidal risk via Frequent Screener    Outcome: Progressing  Flowsheets (Taken 6/29/2024 1149)  Will have no self-injury during hospital stay:   Secure patient belongings   Ensure family/visitors adhere to safety recommendations   Every shift and PRN: Re-assess suicidal risk via Frequent Screener   Maintain a safe environment   Ensure constant observer at bedside with Q15M safety checks   Ensure safety tray has been added to patient's diet order     Problem: Depression  Goal: Will be euthymic at discharge  Description: INTERVENTIONS:  1. Administer medication as ordered  2. Provide emotional support via 1:1 interaction with staff  3. Encourage involvement in milieu/groups/activities  4. Monitor for social isolation  Outcome: Progressing     Problem: Psychosis  Goal: Will report no hallucinations or delusions  Description: INTERVENTIONS:  1. Administer medication as  ordered  2. Assist with reality testing to support increasing orientation  3. Assess if patient's hallucinations or delusions are encouraging self harm or harm to others and intervene as appropriate  Outcome: Progressing     Problem: Anxiety  Goal: Will report anxiety at manageable levels  Description: INTERVENTIONS:  1. Administer medication as ordered  2. Teach and rehearse alternative coping skills  3. Provide emotional support with 1:1 interaction with staff  Outcome: Progressing  Flowsheets (Taken 6/29/2024 1149)  Will report anxiety at manageable levels:   Teach and rehearse alternative coping skills   Provide emotional support with 1:1 interaction 
  Problem: Self Harm/Suicidality  Goal: Will have no self-injury during hospital stay  Description: INTERVENTIONS:  1.  Ensure constant observer at bedside with Q15M safety checks  2.  Maintain a safe environment  3.  Secure patient belongings  4.  Ensure family/visitors adhere to safety recommendations  5.  Ensure safety tray has been added to patient's diet order  6.  Every shift and PRN: Re-assess suicidal risk via Frequent Screener    Outcome: Progressing  Flowsheets (Taken 6/30/2024 1603)  Will have no self-injury during hospital stay:   Ensure constant observer at bedside with Q15M safety checks   Ensure safety tray has been added to patient's diet order   Every shift and PRN: Re-assess suicidal risk via Frequent Screener   Maintain a safe environment   Secure patient belongings   Ensure family/visitors adhere to safety recommendations     Problem: Depression  Goal: Will be euthymic at discharge  Description: INTERVENTIONS:  1. Administer medication as ordered  2. Provide emotional support via 1:1 interaction with staff  3. Encourage involvement in milieu/groups/activities  4. Monitor for social isolation  Outcome: Progressing     Problem: Psychosis  Goal: Will report no hallucinations or delusions  Description: INTERVENTIONS:  1. Administer medication as  ordered  2. Assist with reality testing to support increasing orientation  3. Assess if patient's hallucinations or delusions are encouraging self harm or harm to others and intervene as appropriate  Outcome: Progressing     Problem: Anxiety  Goal: Will report anxiety at manageable levels  Description: INTERVENTIONS:  1. Administer medication as ordered  2. Teach and rehearse alternative coping skills  3. Provide emotional support with 1:1 interaction with staff  Outcome: Progressing  Flowsheets (Taken 6/30/2024 1603)  Will report anxiety at manageable levels:   Teach and rehearse alternative coping skills   Provide emotional support with 1:1 interaction 
SUBJECTIVE:    Sore Throat, states he is getting a cold      OBJECTIVE:    BP (!) 108/57   Pulse 99   Temp 99 °F (37.2 °C)   Resp 18   Ht 1.803 m (5' 11\")   Wt 60.1 kg (132 lb 6.4 oz)   SpO2 97%   BMI 18.47 kg/m²      Latest Reference Range & Units 06/25/24 14:40 06/27/24 08:04 06/29/24 05:55 06/30/24 06:23   Sodium 136 - 145 mmol/L 140   139   Potassium 3.5 - 5.0 mmol/L 3.8   4.0   Chloride 98 - 111 mmol/L 101   103   CARBON DIOXIDE 22 - 29 mmol/L 22   25   BUN,BUNPL 6 - 20 mg/dL 12   13   Creatinine 0.5 - 1.2 mg/dL 0.8   0.7   Anion Gap 7 - 19 mmol/L 17   11   Est, Glom Filt Rate >60  >90   >90   Glucose 74 - 109 mg/dL 92   98   Calcium 8.6 - 10.0 mg/dL 9.5   8.9   Total Protein 6.6 - 8.7 g/dL 8.1   6.5 (L)   Vitamin B-12 211 - 946 pg/mL  440     Cholesterol, Total 160 - 199 mg/dL   160    HDL Cholesterol 55 - 121 mg/dL   47 (L)    LDL Cholesterol <100 mg/dL   79    Triglycerides 0 - 149 mg/dL   171 (H)    Albumin 3.5 - 5.2 g/dL 5.3 (H)   4.4   Alkaline Phosphatase 40 - 130 U/L 74   64   ALT 5 - 41 U/L <5 !   8   AST 5 - 40 U/L 19   11   Total Bilirubin 0.2 - 1.2 mg/dL 0.9   0.4   Hemoglobin A1C 4.0 - 6.0 %   4.8    TSH Reflex FT4 0.27 - 4.20 uIU/mL  2.59     Ethanol Lvl mg/dL <10      Vit D, 25-Hydroxy >=30 ng/mL  23.6 (L)     VITAMIN B12   Rpt     WBC 4.8 - 10.8 K/uL 7.9   9.9   RBC 4.70 - 6.10 M/uL 5.46   4.95   Hemoglobin Quant 14.0 - 18.0 g/dL 16.8   15.2   Hematocrit 42.0 - 52.0 % 48.7   43.6   MCV 80.0 - 94.0 fL 89.2   88.1   MCH 27.0 - 31.0 pg 30.8   30.7   MCHC 33.0 - 37.0 g/dL 34.5   34.9   MPV 9.4 - 12.4 fL 9.7   10.1   RDW 11.5 - 14.5 % 11.6   11.9   Platelet Count 130 - 400 K/uL 241   184   Neutrophils % 50.0 - 65.0 % 84.1 (H)   76.3 (H)   Lymphocyte % 20.0 - 40.0 % 11.0 (L)   14.0 (L)   Monocytes % 0.0 - 10.0 % 4.3   8.6   Eosinophils % 0.0 - 5.0 % 0.0   0.5   Basophils % 0.0 - 1.0 % 0.3   0.3   Neutrophils Absolute 1.5 - 7.5 K/uL 6.6   7.5   Immature Granulocytes # K/uL 0.0   0.0 
alternative coping skills   Provide emotional support with 1:1 interaction with staff  6/26/2024 1143 by Mireille Villa  Outcome: Progressing     Problem: Spiritual Care  Goal: Pt/Family able to move forward in process of forgiving self, others, and/or higher power  Description: INTERVENTIONS:  1. Assist patient/family to overcome blocks to healing by use of spiritual practices (prayer, meditation, guided imagery, reiki, breath work, etc).  2. De-myth guilt and help patient/family identify possible irrational spiritual/cultural beliefs and values.  3. Explore possibilities of making amends & reconciliation with self, others, and/or a greater power.  4. Guide patient/family in identifying painful feelings of guilt.  5. Help patient/famiy explore and identify spiritual beliefs, cultural understandings or values that may help or hinder letting go of issue.  6. Help patient/family explore feelings of anger, bitterness, resentment.  7. Help patient/family identify and examine the situation in which these feelings are experienced.  8. Help patient/family identify destructive displacement of feelings onto other individuals.  9. Invite use of sacraments/rituals/ceremonies as appropriate (e.g. - confession, anointing, smudging).  10. Refer patient/family to formal counseling and/or to [facility] for further support work.  Outcome: Progressing  Flowsheets (Taken 6/26/2024 1406)  Patient/family able to move forward in process of forgiving self, others, and/or higher power:   De-myth guilt and help patient/family identify possible irrational spiritual/cultural beliefs and values   Assist patient to overcome blocks to healing by use of spiritual practices (prayer, meditation, guided imagery, reiki, breath work, etc)   Explore possibilities of making amends and reconciliation with self, others, and/or a greater power   Guide patient/family in identifying painful feelings of guilt   Help patient explore and identify spiritual 
feelings of guilt.  5. Help patient/famiy explore and identify spiritual beliefs, cultural understandings or values that may help or hinder letting go of issue.  6. Help patient/family explore feelings of anger, bitterness, resentment.  7. Help patient/family identify and examine the situation in which these feelings are experienced.  8. Help patient/family identify destructive displacement of feelings onto other individuals.  9. Invite use of sacraments/rituals/ceremonies as appropriate (e.g. - confession, anointing, smudging).  10. Refer patient/family to formal counseling and/or to [facility] for further support work.  Outcome: Progressing     Problem: Coping  Goal: Pt/Family able to verbalize concerns and demonstrate effective coping strategies  Description: INTERVENTIONS:  1. Assist patient/family to identify coping skills, available support systems and cultural and spiritual values  2. Provide emotional support, including active listening and acknowledgement of concerns of patient and caregivers  3. Reduce environmental stimuli, as able  4. Instruct patient/family in relaxation techniques, as appropriate  5. Assess for spiritual pain/suffering and initiate Spiritual Care, Psychosocial Clinical Specialist consults as needed  6/27/2024 1456 by Kayley Whitehead, RN  Outcome: Progressing  6/27/2024 1240 by Miguelina Garcia  Outcome: Progressing  Note:                                                                     Group Therapy Note    Date: 6/27/2024  Start Time: 1100  End Time:  1130  Number of Participants: 9    Type of Group: Healthy Living/Wellness    Wellness Binder Information  Module Name:  Stress  Session Number:  5    Group Goal for Pt:  To raise awareness of the effectiveness of diversionary coping skills    Notes:  Pt demonstrated improved awareness of the effectiveness of diversionary coping skills by actively participating in group activity.    Status After Intervention:

## 2024-07-02 NOTE — PROGRESS NOTES
Group Therapy Note    Date: 6/28/2024  Start Time: 0800  End Time:  0830  Number of Participants: 10    Type of Group: Community Meeting    Wellness Binder Information  Module Name:  Self inventory  Session Number:  1    Patient's Goal:  Putting my maxim into God and Den that everything is going to be okay.      Notes:  Slept well but felt restless    Status After Intervention:  Unchanged    Participation Level: Active Listener    Participation Quality: Appropriate      Speech:  normal      Thought Process/Content: Logical      Affective Functioning: Congruent      Mood: calm      Level of consciousness:  Alert      Response to Learning: Able to verbalize current knowledge/experience      Endings: None Reported    Modes of Intervention: Education, Support, and Exploration      Discipline Responsible: Registered Nurse      Signature:  Lyle Woo RN    
                                                                Group Note    Date: 06/26/24  Start Time: 6:45 PM   End Time:7:15 PM     Number of Participants: 10    Type of Group: Wrap-Up     Patient's Goal:      Notes:      Status After Intervention:  Improved    Participation Level: Minimal    Participation Quality: Appropriate    Speech:  normal    Thought Process/Content: Logical    Mood: anxious and depressed    Level of consciousness:  Alert and Oriented x4    Response to Learning: Able to verbalize current knowledge/experience    Modes of Intervention: Education and Support    Discipline Responsible: Registered Nurse     Signature:  MAYITO CULVER RN  
                                                                Group Note    Date: 06/28/24   Start Time: 8:00 PM   End Time:8:30 PM     Number of Participants: 7    Type of Group: Wrap-Up     Patient's Goal:      Notes:  see wrap up sheet    Status After Intervention:  Unchanged    Participation Level: Active Listener    Participation Quality: Appropriate    Speech:  normal    Thought Process/Content: Logical    Mood: anxious    Level of consciousness:  Alert and Oriented x4    Response to Learning: Able to verbalize current knowledge/experience    Modes of Intervention: Education and Support    Discipline Responsible: Registered Nurse     Signature:  aKmryn Delaney RN  
                                                                Group Note    Date: 06/28/24  Start Time: 7:00 PM   End Time:8:00 PM     Number of Participants: 6    Type of Group: Recovery / AA    Patient's Goal:      Notes:      Status After Intervention:  Unchanged    Participation Level: Active Listener    Participation Quality: Appropriate    Speech:  normal    Thought Process/Content: Logical    Mood: anxious    Level of consciousness:  Alert and Oriented x4    Response to Learning: Able to verbalize current knowledge/experience    Modes of Intervention: Education and Support    Discipline Responsible: Registered Nurse     Signature:  Kamryn Delaney RN  
                                                                Group Note    Date: 06/30/24  Start Time: 6:45 PM   End Time:7:15 PM     Number of Participants: 9    Type of Group: Wrap-Up     Patient's Goal:      Notes:      Status After Intervention:  Improved    Participation Level: Minimal    Participation Quality: Appropriate    Speech:  normal    Thought Process/Content: Logical    Mood: anxious and depressed    Level of consciousness:  Alert and Oriented x4    Response to Learning: Able to verbalize current knowledge/experience    Modes of Intervention: Education and Support    Discipline Responsible: Registered Nurse     Signature:  MAYITO CULVER RN  
                                                                Group Note    Date: 06/30/24  Start Time: 7:30 PM   End Time:8:00 PM     Number of Participants: 10    Type of Group: Wrap-Up     Patient's Goal:  reflect on today    Notes:  see wrap-up sheet    Status After Intervention:  Unchanged    Participation Level: Interactive    Participation Quality: Appropriate    Speech:  normal    Thought Process/Content: Linear    Mood:  cooperative    Level of consciousness:  Alert    Response to Learning: Progressing to goal    Modes of Intervention: Education and Support    Discipline Responsible: Registered Nurse     Signature:  Jen March RN  
                                                                Group Note    Date: 06/30/24  Start Time: 8:00 AM   End Time:8:30 AM     Number of Participants: 9    Type of Group: Community/Goal     Patient's Goal:  praying more to get ahold of anxiety more    Notes:      Status After Intervention:      Participation Level: Active Listener    Participation Quality: Appropriate    Speech:  normal    Thought Process/Content: Logical    Mood:  calm    Level of consciousness:  Alert    Response to Learning: Able to verbalize current knowledge/experience    Modes of Intervention: Education and Support    Discipline Responsible: Behavioral Health Technician     Signature:  Qian Blanco  
                                                                Group Note    Date: 07/01/24  Start Time: 8:30 AM   End Time:9:00 AM     Number of Participants: 13    Type of Group: Community/Goal     Patient's Goal:  socializing and praying    Notes:      Status After Intervention:      Participation Level: Active Listener    Participation Quality: Appropriate    Speech:  normal    Thought Process/Content: Logical    Mood:  calm    Level of consciousness:  Alert    Response to Learning: Able to verbalize current knowledge/experience    Modes of Intervention: Education and Support    Discipline Responsible: Behavioral Health Technician     Signature:  Qian Blanco  
                                                 Treatment Team Note:    Target Symptoms/Reason for admission: Per nursing admission assessment - Reason for Admission: Yves Haile is a 23 year old that came to the ED this evening with increased anxiety. He reports that he has been suffering with anxiety and worry over the past few days. He reports needing help mentally and spirtually as a Anglican. He reports suicidal thoughts sometimes and thoughts about his brother making deals on his soul. Per patien'ts step mother he told her today that he wanted to shoot himself. He struggles with his emotions. He thinks that he does something bad all the time. He was struggling at home with the family. Patient is religiously preoccupied and having paranoid delusions.    Diagnoses per psych provider: Suicidal ideation [R45.297]    Therapist met with treatment team to discuss patients treatment and discharge plans.    Patient's aftercare plan is: [facility]    Aftercare appointments made: Yes    Pt lives with:  Family    Collateral obtained from:  Pt's stepmotherBetty  Collateral obtained on:06/26/24    Attending groups: Yes    Behavior: calm and cooperative, social with peers, reports he is looking forward to going home    Has patient been completing ADL's:  Yes    SI:  patient denies SI  Plan: no   If yes describe: N/A - patient denies plan  HI:  patient denies HI  If present describe: N/A  Delusions: patient denies delusions  If present describe: N/A  Hallucinations: patient denies hallucinations  If present describe: N/A    Patient rates their -- Depression: 1-10:  0  Anxiety:1-10:  0    Sleeping: Fair    Taking medication: Yes    Misc: Pt is possible discharge today.                                                    
                                                 Treatment Team Note:    Target Symptoms/Reason for admission: Per nursing admission assessment - Reason for Admission: Yves Haile is a 23 year old that came to the ED this evening with increased anxiety. He reports that he has been suffering with anxiety and worry over the past few days. He reports needing help mentally and spirtually as a Yazdanism. He reports suicidal thoughts sometimes and thoughts about his brother making deals on his soul. Per patien'ts step mother he told her today that he wanted to shoot himself. He struggles with his emotions. He thinks that he does something bad all the time. He was struggling at home with the family. Patient is religiously preoccupied and having paranoid delusions.    Diagnoses per psych provider: Suicidal ideation [R43.508]    Therapist met with treatment team to discuss patients treatment and discharge plans.    Patient's aftercare plan is: [facility]    Aftercare appointments made: No - SW will make discharge appointments    Pt lives with:  family    Collateral obtained from:  Pt's stepmotherBetty  Collateral obtained on:06/26/24    Attending groups:  Minimal participation    Behavior: cooperative, social with peers    Has patient been completing ADL's:  Yes    SI:  patient denies SI  Plan: no   If yes describe: N/A - patient denies plan  HI:  patient denies HI  If present describe: N/A  Delusions: patient denies delusions  If present describe: N/A  Hallucinations: patient denies hallucinations  If present describe: N/A    Patient rates their -- Depression: 1-10:  0  Anxiety:1-10:  \"better\"    Sleeping: Fair    Taking medication: Yes and no    Misc:                                                    
            [facility] ADULT INITIAL INTAKE ASSESSMENT     6/25/24    Yves Haile ,a 23 y.o. male, presents to the ED for a psychiatric assessment.     ED Arrival time: 1418  ED physician: MD Kurt  [facility] Notification time: IN ER  [facility] Assessment start time: 1605  Psychiatrist call time:   Spoke with Dr. Faust    Patient is referred by: Step mom brought him    Reason for visit to ED - Presenting problem:     PT states reason for ED visit, \"I've been suffering from anxiety and worry over the past few days. I've needed some help mentally and spiritually as a Mu-ism. I felt hot and flushed in my face all day.\" Pt reports suicidal thoughts \"sometimes\" and thoughts about his brother and making deals on his soul. Pt is religiously preoccupied and having paranoid delusions. Pt denies AVH. Pt denies sleeping difficulty and has had decreased appetite.Pt has been diagnosed with Autism. He started to see a therapist in [place] a few months ago and is suppose to be returning to [place] next week. Pt is on no medications at this time. Pt has never had any past suicide attempts. Pt has never been inpt before for mental health reasons.Pt has never seen a Psychiatrist. Pt denies any alcohol or drug use. Pt lives with his mother, step dad and brother in [place]. Pt is unemployed.   Per step mom he told her today he wanted to shoot himself. He struggles with his emotions. He thinks he does something bad all the time.The reason the pt came here was to visit and because  he was struggling at home with the family. Pt is suppose to return to [place] on July 5th. Step mom is concerned for his safety.    ER Physician Reports: Yves Haile is a 23 y.o. male with history of autism who presents to the emergency department with recent suicidal thoughts.  Patient states he has been anxious and depressed.  He has been struggling \"spiritually.\"  Patient states that he began having suicidal thoughts \"a few days ago.\"  Patient states that 
        [facility]      Psych[facility] Progress Note    Name:  Yves Haile  Date:  6/27/2024  Age:  23 y.o.  Sex:  male  Ethnicity:   Primary Care Physician:  No primary care provider on file.   Patient Care Team:  No care team member to display  Chief Complaint: \" I am doing better today.\"        Historian:patient  Complaint Type: anxiety, sleep disturbance, and delusions   Course of Symptoms: ongoing  Precipitating Factors: Autism      Subjective  Nursing notes were reviewed during the night and patient had no behavioral issues during the night.  As needed medications administered during the night include hydroxyzine.  Today he denies suicidal ideation, homicidal ideation and visual hallucinations.  He does report today that he is still experiencing \"bad thoughts.\"  He reports they are intrusive telling him that if he \"gets to go home something bad will happen to his baby brother soul.\"  He again denies that these are auditory hallucinations.  His thought process is linear and organized.  He is not responding to any internal stimuli.  Patient reports sleep as, \"  I slept good last night.\" Patient has been calm and cooperative with staff and peers. Patient has been compliant with medications. Patient has been attending group therapy. Patient reports appetite as, \"I am eating a lot better.\" Patient reports no side effects from medications.      Patients father \"Hector\" was called and informed of treatment plan.    Objective  Vital Signs:  Last set of tests and vitals:  Vitals:    06/27/24 0717   BP: 132/75   Pulse: 76   Resp: 14   Temp: 98.2 °F (36.8 °C)   SpO2: 98%       Previous Psychiatric/Substance Use History      Medical History:  History reviewed. No pertinent past medical history.     SHORT History:   Social History     Substance and Sexual Activity   Alcohol Use Never         Social History     Substance and Sexual Activity   Drug Use Never        Social History     Tobacco Use   Smoking 
        [facility]      Psychiatric Progress Note    Name:  Yves Haile  Date:  6/28/2024  Age:  23 y.o.  Sex:  male  Ethnicity:   Primary Care Physician:  No primary care provider on file.   Patient Care Team:  No care team member to display  Chief Complaint: \" I have had a few of those thoughts again.\"        Historian:patient  Complaint Type: anxiety and psychosis  Course of Symptoms: ongoing  Precipitating Factors: Autism          Subjective  Nursing notes were reviewed and patient had no behavioral issues during the night.  No as needed medications were administered during the night.  Today he denies suicidal ideations and homicidal ideations.  He reports that he has had a few \"bad thoughts today.\"  He states, \"I had one thought this morning that harm would be done to my baby brother for Satan.\"  He states, \"I just had to say I rebuke these thoughts in the name of Den and they went away.\"  He reports right before lunch he saw still image of himself with a gun in his mouth.  He states, \"it is the same gun I used to shoot skeet for the first time on Saturday.\"  He reports that he wants to live and he is not sure why is having these type of thoughts. He reports the thoughts are intrusive and happen out of nowhere.  He reports he would never do any harm to his baby brother.  He states, \"I believe that these thoughts are coming from the enemy, Satan.\"  Patient reports sleep as, \" I am sleeping really good.\" Patient has been calm and cooperative with staff and peers.  He has been out in the milieu playing board games with other peers.  Patient has been compliant with medications. Patient has been attending group therapy. Patient reports appetite as, \" I am eating the majority of my food.\" Patient reports no side effects from medications.      Objective  Vitals:    06/28/24 0725   BP: 127/78   Pulse: 96   Resp: 18   Temp: 97.3 °F (36.3 °C)   SpO2: 99%       Previous Psychiatric/Substance 
    Treatment Team Note:     Target Symptoms/Reason for admission: Per nursing admission assessment - Reason for Admission: Yves Haile is a 23 year old that came to the ED this evening with increased anxiety. He reports that he has been suffering with anxiety and worry over the past few days. He reports needing help mentally and spirtually as a Anglican. He reports suicidal thoughts sometimes and thoughts about his brother making deals on his soul. Per patien'ts step mother he told her today that he wanted to shoot himself. He struggles with his emotions. He thinks that he does something bad all the time. He was struggling at home with the family. Patient is religiously preoccupied and having paranoid delusions.     Diagnoses per psych provider: Suicidal ideation [R45.049]     Therapist met with treatment team to discuss patients treatment and discharge plans.     Patient's aftercare plan is: [facility]     Aftercare appointments made: No - SW will make discharge appointments     Pt lives with:  family     Collateral obtained from:  Pt's stepmotherBetty  Collateral obtained on:06/26/24     Attending groups:  Minimal participation     Behavior: cooperative, social with peers     Has patient been completing ADL's:  Yes     SI:  patient denies SI  Plan: no   If yes describe: N/A - patient denies plan  HI:  patient denies HI  If present describe: N/A  Delusions: patient denies delusions  If present describe: N/A  Hallucinations: patient denies hallucinations  If present describe: N/A     Patient rates their -- Depression: 1-10:  0  Anxiety:1-10:  4-5     Sleeping: Fair     Taking medication: Yes and no     Misc:  
   06/26/24 1552   Encounter Summary   Encounter Overview/Reason Behavioral Health   Service Provided For Patient   Referral/Consult From Multi-disciplinary team  (Consult:  Spiritual distress)   Complexity of Encounter Moderate   Begin Time 1534   End Time  1557   Total Time Calculated 23 min   Spiritual/Emotional needs   Type Spiritual Support   Rituals, Rites and Sacraments   Type Blessings   Behavioral Health    Type  Other (comment)  (Consult)   Advance Care Planning   Type ACP conversation   Assessment/Intervention/Outcome   Assessment Coping  (Pt shared his powerlessness dealing with \"intrusive thoughts.\")   Intervention Active listening  (Encouraged patient to talk more, specifically to describe some of them as well as describe his loving thoughts.)   Outcome Venting emotion  (Patient stated - he would be fine.)   Plan and Referrals   Plan/Referrals No future visits requested   Does the patient have a [facility] PCP? No     Electronically signed by JOHN Lynn on 6/26/2024 at 4:00 PM   
 Nursing Admission Note    Reason for Admission: Yves Haile is a 23 year old that came to the ED this evening with increased anxiety. He reports that he has been suffering with anxiety and worry over the past few days. He reports needing help mentally and spirtually as a Amish. He reports suicidal thoughts sometimes and thoughts about his brother making deals on his soul. Per patien'ts step mother he told her today that he wanted to shoot himself. He struggles with his emotions. He thinks that he does something bad all the time. He was struggling at home with the family. Patient is religiously preoccupied and having paranoid delusions.    Additional Notes:  Patient restless and fidgety on admission. He reports that he does not take medications and can usually sleep. Patient kept pulling at his pants and unable to sit still. Patient offered sleep medications. Patient initially seemed uneasy about taking any medications. Patient offered something for anxiety later and sleep again. Patient agreed to take Melatonin and Atarax this evening. He made several phone calls this evening, including to a prayer line.     Patient Active Problem List   Diagnosis    Suicidal ideation       C-SSRS:  C-SSRS Completed: yes.    Risk Assessment Completed: yes.    Risk of Suicide per C-SSRS: Risk of Suicide: Low Risk  Provider Notified of the C-SSRS Screening and   Risk Assessment Findings: yes.    Order for Constant Observer Continued: no.    If no, discontinued due to the following reasons:  Patient is on 15 minute safety checks yes.  Safety Features on the unit: yes.    No previous safety concerns while on unit: yes.  Patient reporting no thoughts of self-harm while on unit: yes.      Suicidal Ideation: no.    If yes, is there a plan?(Describe) not at this time  Homicidal Ideation:  no.   If yes, describe: denies  Auditory/Visual Hallucinations:  no.    If yes, describe AVH? denies    Addictive Behavior:   Addictive 
CLINICAL PHARMACY NOTE: MEDS TO BEDS    Total # of Prescriptions Filled: 3   The following medications were delivered to the patient:  Current Discharge Medication List        START taking these medications    Details   fluticasone (FLONASE) 50 MCG/ACT nasal spray 2 sprays by Each Nostril route 2 times daily as needed for Rhinitis  Qty: 16 g, Refills: 0      hydrOXYzine HCl (ATARAX) 25 MG tablet Take 1 tablet by mouth 3 times daily as needed for Anxiety  Qty: 30 tablet, Refills: 0      risperiDONE (RISPERDAL) 1 MG tablet Take 1 tablet by mouth nightly  Qty: 30 tablet, Refills: 0               Additional Documentation:    Handed scripts to Tony (Mehul) at nurses station, Copay was paid for by the [employer].   
[facility] Adult Unit Daily Assessment  Nursing Progress Note    Room: [facility]611-01   Name: Yves Haile   Age: 23 y.o.   Gender: male   Dx: Suicidal ideation  Precautions: suicide risk  Inpatient Status: voluntary     SLEEP:  Sleep Quality Good  Sleep Medications: No   PRN Sleep Meds: No     MEDICAL:  Other PRN Meds: No   Med Compliant: Yes  Accu-Chek: No  Oxygen/CPAP/BiPAP: No  CIWA/CINA: No   PAIN Assessment: none  Side Effects from medication: No    Metabolic Screening:  No results found for: \"LABA1C\"  No results found for: \"CHOL\"  No results found for: \"TRIG\"  No results found for: \"HDL\"  No components found for: \"LDLCAL\"  No components found for: \"LABVLDL\"  Body mass index is 18.47 kg/m².  BP Readings from Last 2 Encounters:   06/26/24 135/77       Medical Bed:   Is patient in a medical bed? no   If medical bed is in use, has nursing secured room while patient is awake and out of the room? NA  Has safety checks by nursing been completed on the bed/room this shift? NA    Protective Factors:  Patient identifies protective factors with nursing staff as follows:   Identifies reasons for living: Yes   Supportive Social Network or family: Yes    Belief that suicide is immoral/high spirituality: Yes   Responsibility to family or others/living with family: Yes   Fear of death or dying due to pain and suffering: No   Engaged in work or school: No  If Patient is unable to identify, reason why?     Depression: 2   Anxiety: 10   SI denies suicidal ideation      HI Negative for homicidal ideation        AVH:no If Hallucinations are present, describe?     Appetite: improved   Percent Meals: 75%   Social: Yes   Speech: normal   Appearance: appropriately dressed, appropriately groomed, good hygiene, looks younger, and healthy looking    GROUP:  Group Participation: Yes  Participation Quality: Active Listener and Interactive    Notes: Patient in day area not socializing with anybody. Patient taken to sensory area for interview. 
Department of Psychiatry  Attending Progress Note      SUBJECTIVE:    23 years old male with previous psychiatric history of autism and OCD, who has been admitted to our psychiatric unit secondary to suicidal ideations.    Patient has been seen in treatment team room with presence of the patient's nurse.  He reported that his condition significantly improved during this hospital stay, stated that his mood is \"pretty good\" today.  He endorses good appetite and went in highly detailed description of his breakfast, reported what he liked or disliked and how much food exactly he was eating during the breakfast.  Patient endorses fair quality of sleep, stated that he did not experience any difficulties to fall asleep and stay asleep, however, when he woke up in the morning he felt that his nostrils were congested and he felt a little tired.  Patient is compliant with currently prescribed medications and denies any side effects.  He denies feeling of anxiety and depression today.  However, he reported that sometimes he still experiences \"bad thoughts\" during the day.  He stated that his intrusive thoughts are less frequent and less intense than before.  He attends group activities in the unit and participates in those activities.  Patient is social with medical staff and other patients in the unit.  He performed his ADLs today and took a shower in the morning.  Patient denies current active suicidal and homicidal ideations, denies any plans.  He denies auditory and visual hallucinations.  Patient did not endorse any delusions or paranoid thoughts during the interview.      OBJECTIVE    Physical  VITALS:  /72   Pulse 68   Temp 96.8 °F (36 °C) (Temporal)   Resp 16   Ht 1.803 m (5' 11\")   Wt 60.1 kg (132 lb 6.4 oz)   SpO2 96%   BMI 18.47 kg/m²   TEMPERATURE:  Current - Temp: 96.8 °F (36 °C); Max - Temp  Av.8 °F (36.6 °C)  Min: 96.8 °F (36 °C)  Max: 98.8 °F (37.1 °C)  RESPIRATIONS RANGE: Resp  Av  Min: 
Discharge Note     Patient is discharging on this date. Patient denies SI, HI, and AVH at this time. Patient reports improvement in behavior and is leaving unit in overall good condition. SW and patient discussed patient's follow up appointments and importance of attending appointments as scheduled, patient voiced understanding and agreement. Patient and SW also discussed patient's safety plan and patient was able to verbally identify: warning signs, coping strategies, places and people that help make the patient feel better/distract negative thoughts, friends/family/agencies/professionals the patient can reach out to in a crisis, and something that is important to the patient/worth living for. Patient was provided the national suicide prevention hotline number (1-249.154.3311) as well as local community behavioral health ([facility]) crisis number for emergencies (1-767-691-2017).     Discharge Disposition: home -lives with family      Pt to follow up with:  [facility] on July 5 , 2024 at 2:00 PM for the intake appointment. Patient will follow up with [facility]   On July  10 , 2024   at 3:30 PM for the medication management appointment.     Referral to outpatient tobacco cessation counseling treatment:  Patient refused referral to outpatient tobacco cessation counseling    SW offered to assist patient with transportation, patient declined transportation assistance  
[facility] Adult Unit Daily Assessment  Nursing Progress Note    Room: [facility]611-01   Name: Yves Haile   Age: 23 y.o.   Gender: male   Dx: Psychosis (HCC)  Precautions: suicide risk  Inpatient Status: voluntary     SLEEP:  Sleep Quality Fair  Sleep Medications: Yes Melatonin 5 mg trazodone 50 mg   PRN Sleep Meds: No     MEDICAL:  Other PRN Meds: Yes atarax 25 mg cepocol   Med Compliant: Yes  Accu-Chek: No  Oxygen/CPAP/BiPAP: No  CIWA/CINA: No   PAIN Assessment: present - adequately treated  Side Effects from medication: No    Metabolic Screening:  Lab Results   Component Value Date    LABA1C 4.8 06/29/2024     Lab Results   Component Value Date    CHOL 160 06/29/2024     Lab Results   Component Value Date    TRIG 171 (H) 06/29/2024     Lab Results   Component Value Date    HDL 47 (L) 06/29/2024     No components found for: \"LDLCAL\"  No components found for: \"LABVLDL\"  Body mass index is 18.47 kg/m².  BP Readings from Last 2 Encounters:   06/30/24 (!) 125/91       Medical Bed:   Is patient in a medical bed? no   If medical bed is in use, has nursing secured room while patient is awake and out of the room? NA  Has safety checks by nursing been completed on the bed/room this shift? yes    Protective Factors:  Patient identifies protective factors with nursing staff as follows:   Identifies reasons for living: Yes   Supportive Social Network or family: Yes    Belief that suicide is immoral/high spirituality: Yes   Responsibility to family or others/living with family: Yes   Fear of death or dying due to pain and suffering: Yes   Engaged in work or school: No  If Patient is unable to identify, reason why?     Depression: 0   Anxiety: 0   SI denies suicidal ideation   Risk of Suicide: No Risk  HI Negative for homicidal ideation        AVH:no If Hallucinations are present, describe?     Appetite: good   Percent Meals: 75%   Social: Yes   Speech: normal   Appearance: appropriately dressed, appropriately groomed, good hygiene, and 
LENNIE spoke with patient in regards to safe discharge planning. Patient requested for SW to contact his step-mother to see where would the best place for patient to follow up for medication management/ therapy services. Reports that he will be provided safe transportation home by his step-mother. States that he is returning home with step-mother and father. Declines needing transportation assistance as well as additional resources/ information.     LENNIE Jackson spoke with step-mother whom was verbally agreeable to patient receiving services at [facility]. Step mother confirmed that she will be providing patient with safe transportation assistance.   
[facility] Adult Unit Daily Assessment  Nursing Progress Note    Room: [facility]/611-01   Name: Yves Haile   Age: 23 y.o.   Gender: male   Dx: Psychosis (HCC)  Precautions: suicide risk  Inpatient Status: voluntary     SLEEP:  Sleep Quality Very good  Sleep Medications: No   PRN Sleep Meds: No     MEDICAL:  Other PRN Meds: Yes   Med Compliant: Yes  Accu-Chek: No  Oxygen/CPAP/BiPAP: No  CIWA/CINA: No   PAIN Assessment: none  Side Effects from medication: No    Metabolic Screening:  Lab Results   Component Value Date    LABA1C 4.8 06/29/2024     Lab Results   Component Value Date    CHOL 160 06/29/2024     Lab Results   Component Value Date    TRIG 171 (H) 06/29/2024     Lab Results   Component Value Date    HDL 47 (L) 06/29/2024     No components found for: \"LDLCAL\"  No components found for: \"LABVLDL\"  Body mass index is 18.47 kg/m².  BP Readings from Last 2 Encounters:   06/29/24 119/74       Medical Bed:   Is patient in a medical bed? no   If medical bed is in use, has nursing secured room while patient is awake and out of the room? NA  Has safety checks by nursing been completed on the bed/room this shift? NA    Protective Factors:  Patient identifies protective factors with nursing staff as follows:   Identifies reasons for living: Yes   Supportive Social Network or family: Yes    Belief that suicide is immoral/high spirituality: Yes   Responsibility to family or others/living with family: Yes   Fear of death or dying due to pain and suffering: No   Engaged in work or school: No  If Patient is unable to identify, reason why?     Depression: 1   Anxiety: 2   SI Occasional thought patient \"shuts down.\"   Risk of Suicide: No Risk  HI Negative for homicidal ideation        AVH:no If Hallucinations are present, describe?     Appetite: good   Percent Meals: 100%   Social: Yes   Speech: normal   Appearance: appropriately dressed, appropriately groomed, good hygiene, looks younger, and healthy looking    GROUP:  Group 
[facility] Adult Unit Daily Assessment  Nursing Progress Note    Room: [facility]/611-01   Name: Yves Haile   Age: 23 y.o.   Gender: male   Dx: Psychosis (HCC)  Precautions: suicide risk  Inpatient Status: voluntary     SLEEP:  Sleep Quality Good  Sleep Medications: Yes   PRN Sleep Meds: Yes     MEDICAL:  Other PRN Meds: Yes   Med Compliant: Yes  Accu-Chek: No  Oxygen/CPAP/BiPAP: No  CIWA/CINA: No   PAIN Assessment: Location: Headache  Side Effects from medication: No    Metabolic Screening:  No results found for: \"LABA1C\"  No results found for: \"CHOL\"  No results found for: \"TRIG\"  No results found for: \"HDL\"  No components found for: \"LDLCAL\"  No components found for: \"LABVLDL\"  Body mass index is 18.47 kg/m².  BP Readings from Last 2 Encounters:   06/28/24 115/72       Medical Bed:   Is patient in a medical bed? no   If medical bed is in use, has nursing secured room while patient is awake and out of the room? NA  Has safety checks by nursing been completed on the bed/room this shift? NA    Protective Factors:  Patient identifies protective factors with nursing staff as follows:   Identifies reasons for living: Yes   Supportive Social Network or family: Yes    Belief that suicide is immoral/high spirituality: Yes   Responsibility to family or others/living with family: No   Fear of death or dying due to pain and suffering: Yes   Engaged in work or school: No  If Patient is unable to identify, reason why?     Depression: 0   Anxiety: better   SI denies suicidal ideation   Risk of Suicide: No Risk  HI Negative for homicidal ideation        AVH:no If Hallucinations are present, describe?     Appetite: good   Percent Meals:    Social: Yes   Speech: normal   Appearance: appropriately dressed    GROUP:  Group Participation: Yes  Participation Quality: Interactive    Notes:     Patient has been social and friendly with his peers. He has been bright today. He took all of his medications this evening. He complained of a headache and 
Progress Note  Yves Haile  6/28/2024 7:55 AM  Subjective:   Admit Date:   6/25/2024      CC/ADMIT DX:       Interval History:   Reviewed overnight events and nursing notes. He has had no new medical issues.   I have reviewed all labs/diagnostics from the last 24hrs.       ROS:   I have done a 10 point ROS and all are negative, except what is mentioned in the HPI.    ADULT DIET; Regular; Safety Tray; Safety Tray (Disposables)    Medications:      risperiDONE  1 mg Oral Nightly    traZODone  50 mg Oral Nightly    melatonin  5 mg Oral Nightly           Objective:   Vitals: /78   Pulse 96   Temp 97.3 °F (36.3 °C)   Resp 18   Ht 1.803 m (5' 11\")   Wt 60.1 kg (132 lb 6.4 oz)   SpO2 99%   BMI 18.47 kg/m²  No intake or output data in the 24 hours ending 06/28/24 0755  General appearance: alert and cooperative with exam  Extremities: extremities normal, atraumatic, no cyanosis or edema  Neurologic:  No obvious focal neurologic deficits.    Assessment and Plan:   Principal Problem:    Suicidal ideation  Resolved Problems:    * No resolved hospital problems. *     Vit D Def    Plan:   Continue present medication(s)    Replace Vit D   Follow with Psych      Discharge planning:   home     Reviewed treatment plans with the patient and/or family.             Electronically signed by Jess Manning MD on 6/28/2024 at 7:55 AM  
Progress Note  Yves Haile  6/29/2024 4:45 AM  Subjective:   Admit Date:   6/25/2024      CC/ADMIT DX:       Interval History:   Reviewed overnight events and nursing notes. He denies any new medical issues.   I have reviewed all labs/diagnostics from the last 24hrs.       ROS:   I have done a 10 point ROS and all are negative, except what is mentioned in the HPI.    ADULT DIET; Regular; Safety Tray; Safety Tray (Disposables)    Medications:      risperiDONE  1 mg Oral Nightly    melatonin  5 mg Oral Nightly           Objective:   Vitals: /72   Pulse 68   Temp 96.8 °F (36 °C) (Temporal)   Resp 16   Ht 1.803 m (5' 11\")   Wt 60.1 kg (132 lb 6.4 oz)   SpO2 96%   BMI 18.47 kg/m²  No intake or output data in the 24 hours ending 06/29/24 0445  General appearance: alert and cooperative with exam  Extremities: extremities normal, atraumatic, no cyanosis or edema  Neurologic:  No obvious focal neurologic deficits.    Assessment and Plan:   Principal Problem:    Psychosis (HCC)  Active Problems:    Suicidal ideation    Autism  Resolved Problems:    * No resolved hospital problems. *     Vit D Def    Plan:   Continue present medication(s)    He is medically stable. I will monitor for any changes or concerns.    Follow with Psych      Discharge planning:   home     Reviewed treatment plans with the patient and/or family.             Electronically signed by Jess Manning MD on 6/29/2024 at 4:45 AM  
Progress Note  Yves Haile  7/1/2024 11:27 PM  Subjective:   Admit Date:   6/25/2024      CC/ADMIT DX:       Interval History:   Reviewed overnight events and nursing notes. He has had no new physical complaints.   I have reviewed all labs/diagnostics from the last 24hrs.       ROS:   I have done a 10 point ROS and all are negative, except what is mentioned in the HPI.    No diet orders on file    Medications:               Objective:   Vitals: /73   Pulse 98   Temp 97.7 °F (36.5 °C)   Resp 18   Ht 1.803 m (5' 11\")   Wt 60.1 kg (132 lb 6.4 oz)   SpO2 96%   BMI 18.47 kg/m²  No intake or output data in the 24 hours ending 07/01/24 1637  General appearance: alert and cooperative with exam  Extremities: extremities normal, atraumatic, no cyanosis or edema  Neurologic:  No obvious focal neurologic deficits.    Assessment and Plan:   Principal Problem:    Psychosis (HCC)  Active Problems:    Suicidal ideation    Autism  Resolved Problems:    * No resolved hospital problems. *     Vit D Def    Plan:   Continue present medication(s)    He remains medically stable. I will monitor for any changes or concerns.    Follow with Psych      Discharge planning:   home     Reviewed treatment plans with the patient and/or family.             Electronically signed by Jess Manning MD on 7/1/2024 at 11:27 PM  
Pt's Step-Mother, Betty Haile, called [facility] with concerns about pt's treatment plan and discharge planning. Verified PHI for her was signed in chart. Addressed her concerns and encouraged her to call back the following day to speak with social work or providers with any additional questions.     Electronically signed by PAUL BOTELLO RN on 6/26/2024 at 8:26 PM   
Pts Step-mother was contacted via phone.  We discussed patiens care, medications and plans for discharge. Ater conversation she seem to be satisfied with information and stated that she understood the plan.  She agreed to call back with any further questions for the nurse.  
Returned call to patient's stepmother, Marty, due to her leaving a message with the Patient Advocate. She states she was able to speak to someone on the unit last week and she no longer has any questions or concerns.  
[facility] Adult Unit Daily Assessment  Nursing Progress Note    Room: [facility]611-01   Name: Yves Haile   Age: 23 y.o.   Gender: male   Dx: Suicidal ideation  Precautions: suicide risk  Inpatient Status: voluntary     SLEEP:  Sleep Quality Fair  Sleep Medications: No   PRN Sleep Meds: No     MEDICAL:  Other PRN Meds: Yes   Med Compliant: Yes and No  Accu-Chek: No  Oxygen/CPAP/BiPAP: No  CIWA/CINA: No   PAIN Assessment: none  Side Effects from medication: No    Metabolic Screening:  No results found for: \"LABA1C\"  No results found for: \"CHOL\"  No results found for: \"TRIG\"  No results found for: \"HDL\"  No components found for: \"LDLCAL\"  No components found for: \"LABVLDL\"  Body mass index is 18.47 kg/m².  BP Readings from Last 2 Encounters:   06/26/24 128/80       Medical Bed:   Is patient in a medical bed? no   If medical bed is in use, has nursing secured room while patient is awake and out of the room? NA  Has safety checks by nursing been completed on the bed/room this shift? NA    Protective Factors:  Patient identifies protective factors with nursing staff as follows:   Identifies reasons for living: Yes   Supportive Social Network or family: Yes    Belief that suicide is immoral/high spirituality: Yes   Responsibility to family or others/living with family: Yes   Fear of death or dying due to pain and suffering: Yes   Engaged in work or school: No  If Patient is unable to identify, reason why?     Depression: 0   Anxiety: better   SI denies suicidal ideation   Risk of Suicide: No Risk  HI Negative for homicidal ideation        AVH:no If Hallucinations are present, describe? denies    Appetite: no change from normal   Percent Meals:    Social: Yes   Speech: normal   Appearance: appropriately dressed    GROUP:  Group Participation: Yes  Participation Quality: Minimal    Notes:     Patient has been out in the milieu and social with his peers. He has been on the phone with his mom this evening. He did not want to take 
SW met with patient to complete psychosocial and lifetime CSSR-S on this date. Patients long and short-term goals discussed. Patient voiced understanding. Treatment plan sheet signed. Patient verbalized understanding of the treatment plan. Patient participated in goals and objectives of the treatment plan. Patient discussed safety plan with . Discussed use of positive coping skills to reduce depression and anxiety.    In the last 6 months has the patient been a danger to self: Yes  In the last 6 months has the patient been a danger to others: No  Legal Guardian/POA: No    Activity Assessment:  Skills:  Talents:  Interests: family    Provided patient with [employer] handout entitled \"Quitting Smoking.\"  Reviewed handout with patient: addressing dangers of smoking, developing coping skills, and providing basic information about quitting.       Patient received all components practical counseling of tobacco practical counseling during the hospital stay.        
[facility] Adult Unit Daily Assessment  Nursing Progress Note    Room: [facility]611-01   Name: Yves Haile   Age: 23 y.o.   Gender: male   Dx: Suicidal ideation  Precautions: suicide risk  Inpatient Status: voluntary     SLEEP:  Sleep Quality Good  Sleep Medications: Yes   PRN Sleep Meds: Yes     MEDICAL:  Other PRN Meds: Yes   Med Compliant: Yes  Accu-Chek: No  Oxygen/CPAP/BiPAP: No  CIWA/CINA: No   PAIN Assessment: none  Side Effects from medication: No    Metabolic Screening:  No results found for: \"LABA1C\"  No results found for: \"CHOL\"  No results found for: \"TRIG\"  No results found for: \"HDL\"  No components found for: \"LDLCAL\"  No components found for: \"LABVLDL\"  Body mass index is 18.47 kg/m².  BP Readings from Last 2 Encounters:   06/27/24 132/75       Medical Bed:   Is patient in a medical bed? no   If medical bed is in use, has nursing secured room while patient is awake and out of the room? NA  Has safety checks by nursing been completed on the bed/room this shift? NA    Protective Factors:  Patient identifies protective factors with nursing staff as follows:   Identifies reasons for living: Yes   Supportive Social Network or family: Yes    Belief that suicide is immoral/high spirituality: Yes   Responsibility to family or others/living with family: No   Fear of death or dying due to pain and suffering: Yes   Engaged in work or school: No  If Patient is unable to identify, reason why?      Depression: 0  Anxiety: 2  SI denies suicidal ideation   Risk of Suicide: No Risk  HI Negative for homicidal ideation        AVH:no If Hallucinations are present, describe?      Appetite: good   Percent Meals: 75%   Social: Yes   Speech: hesitant   Appearance: appropriately dressed, appropriately groomed, and healthy looking    GROUP:  Group Participation: Yes  Participation Quality: Active Listener    Notes:     Pt states that he slept good last night.  He has a good appetite.  He is social with peers and staff.  He did attend 
[facility] Adult Unit Daily Assessment  Nursing Progress Note    Room: [facility]611-01   Name: Yves Haile   Age: 23 y.o.   Gender: male   Dx: Suicidal ideation  Precautions: suicide risk  Inpatient Status: voluntary     SLEEP:  Sleep Quality Fair  Sleep Medications: Yes   PRN Sleep Meds: No     MEDICAL:  Other PRN Meds: No   Med Compliant: Yes  Accu-Chek: No  Oxygen/CPAP/BiPAP: No  CIWA/CINA: No   PAIN Assessment: none  Side Effects from medication: No    Metabolic Screening:  No results found for: \"LABA1C\"  No results found for: \"CHOL\"  No results found for: \"TRIG\"  No results found for: \"HDL\"  No components found for: \"LDLCAL\"  No components found for: \"LABVLDL\"  Body mass index is 18.47 kg/m².  BP Readings from Last 2 Encounters:   06/27/24 128/72       Medical Bed:   Is patient in a medical bed? no   If medical bed is in use, has nursing secured room while patient is awake and out of the room? NA  Has safety checks by nursing been completed on the bed/room this shift? yes    Protective Factors:  Patient identifies protective factors with nursing staff as follows:   Identifies reasons for living: Yes   Supportive Social Network or family: Yes    Belief that suicide is immoral/high spirituality: Yes   Responsibility to family or others/living with family: No   Fear of death or dying due to pain and suffering: Yes   Engaged in work or school: No  If Patient is unable to identify, reason why?     Depression: 0   Anxiety: 4-5   SI denies suicidal ideation   Risk of Suicide: No Risk  HI Negative for homicidal ideation        AVH:no If Hallucinations are present, describe?     Appetite: good   Percent Meals:    Social: Yes   Speech: normal   Appearance: appropriately dressed    GROUP:  Group Participation: Yes  Participation Quality: Minimal    Notes:     Patient has been out of his room and social with peers. Patient was hesitant on taking prescribe antipsychotic he reported that it was against his [employer]. After education 
Sw spoke with the patients step mother, and she is demanding that he will be  discharged from the hospital discharged from the hospital and she is very upset because she has not been able to have any information shared with her. Patient step mother stated that she thinks that patient is on a hold for 72 hour  
Participation: Yes  Participation Quality: Active Listener and Interactive    Notes: Pt is in the day area during this assessment he is calm and cooperative . Pt is social with staff and peers , he has been playing games with peers . Pt has a good appetite, he is friendly , pt performs ADL's  , he is well groomed, and he reports good sleep. Pt did complain of a scratchy sore throat the hospitalist  ordered cepacol  and CBC with differential and a CMP. Will continue to monitor.          
Yes  Participation Quality: Active Listener and Interactive    Notes: Patient had been in the day area socializing with most of the other patients and approached the nursing station for a prn medication. Patient friendly and talkative said his sleep was good and that he got about  hours all together. Although patient said that he woke up around 1 and could not get back to sleep and ended up taking a shower before going back to sleep around 2:30. Patient rated his depression as a 0 and his anxiety as a 2. Patient denies SI, HI and AVH.         Electronically signed by Rajeev Bella RN on 6/30/24 at 3:30 PM CDT  
groups today.  He is compliant with medications.  He denies SI, HI and AVH    Electronically signed by Kayley Whitehead RN on 6/28/24 at 5:21 PM CDT  
management and locking extra medication in a secured location was explained and reccommended to collateral caller.     Discharge Disposition: home -lives with family    Additional Info:    
understanding of AVS:  yes.    Suicidal Ideations? No Risk of Suicide: No Risk   HI? Negative for homicidal ideation       AVH? Denies    Status EXAM upon discharge:  Mental Status and Behavioral Exam  Normal: Yes  Level of Assistance: Independent/Self  Facial Expression: Brightened  Affect: Congruent  Level of Consciousness: Alert  Frequency of Checks: 4 times per hour, close  Mood:Normal: Yes  Mood: Elated  Motor Activity:Normal: Yes  Motor Activity: Increased  Eye Contact: Good  Observed Behavior: Friendly, Cooperative  Sexual Misconduct History: Current - no  Preception: [place] to person, [place] to time, [place] to place, [place] to situation  Attention:Normal: Yes  Attention: Distractible  Thought Processes: Circumstantial  Thought Content:Normal: Yes  Thought Content: Preoccupations  Depression Symptoms: No problems reported or observed.  Anxiety Symptoms: No problems reported or observed.  Carolann Symptoms: No problems reported or observed.  Hallucinations: None  Delusions: No  Delusions: Other (comment) (No signs and symptoms)  Memory:Normal: Yes  Insight and Judgment: No  Insight and Judgment: Poor judgment, Poor insight    AVS/Transition Record has been discussed with patient and a copy was given to the patient.  The AVS/Transition Record was faxed to the next level of care today.

## 2024-09-06 ENCOUNTER — HOSPITAL ENCOUNTER (EMERGENCY)
Age: 24
Discharge: HOME | End: 2024-09-07
Attending: PEDIATRICS
Payer: MEDICAID

## 2024-09-06 DIAGNOSIS — F41.9 ANXIETY: Primary | ICD-10-CM

## 2024-09-06 LAB
ALBUMIN SERPL-MCNC: 4.8 G/DL (ref 3.5–5.2)
ALP SERPL-CCNC: 72 U/L (ref 40–129)
ALT SERPL-CCNC: 9 U/L (ref 5–41)
AMPHET UR QL SCN: NEGATIVE
ANION GAP SERPL CALCULATED.3IONS-SCNC: 11 MMOL/L (ref 7–19)
AST SERPL-CCNC: 12 U/L (ref 5–40)
BARBITURATES UR QL SCN: NEGATIVE
BASOPHILS # BLD: 0 K/UL (ref 0–0.2)
BASOPHILS NFR BLD: 0.4 % (ref 0–1)
BENZODIAZ UR QL SCN: NEGATIVE
BILIRUB SERPL-MCNC: 0.3 MG/DL (ref 0.2–1.2)
BUN SERPL-MCNC: 14 MG/DL (ref 6–20)
BUPRENORPHINE URINE: NEGATIVE
CALCIUM SERPL-MCNC: 9.8 MG/DL (ref 8.6–10)
CANNABINOIDS UR QL SCN: NEGATIVE
CHLORIDE SERPL-SCNC: 103 MMOL/L (ref 98–111)
CO2 SERPL-SCNC: 27 MMOL/L (ref 22–29)
COCAINE UR QL SCN: NEGATIVE
CREAT SERPL-MCNC: 0.9 MG/DL (ref 0.7–1.2)
DRUG SCREEN COMMENT UR-IMP: NORMAL
EOSINOPHIL # BLD: 0 K/UL (ref 0–0.6)
EOSINOPHIL NFR BLD: 0.4 % (ref 0–5)
ERYTHROCYTE [DISTWIDTH] IN BLOOD BY AUTOMATED COUNT: 11.8 % (ref 11.5–14.5)
ETHANOLAMINE SERPL-MCNC: <10 MG/DL (ref 0–0.08)
FENTANYL SCREEN, URINE: NEGATIVE
GLUCOSE SERPL-MCNC: 155 MG/DL (ref 70–99)
HCT VFR BLD AUTO: 46 % (ref 42–52)
HGB BLD-MCNC: 16 G/DL (ref 14–18)
IMM GRANULOCYTES # BLD: 0 K/UL
LYMPHOCYTES # BLD: 1.6 K/UL (ref 1.1–4.5)
LYMPHOCYTES NFR BLD: 23.2 % (ref 20–40)
MCH RBC QN AUTO: 30.9 PG (ref 27–31)
MCHC RBC AUTO-ENTMCNC: 34.8 G/DL (ref 33–37)
MCV RBC AUTO: 89 FL (ref 80–94)
METHADONE UR QL SCN: NEGATIVE
METHAMPHETAMINE, URINE: NEGATIVE
MONOCYTES # BLD: 0.5 K/UL (ref 0–0.9)
MONOCYTES NFR BLD: 7.1 % (ref 0–10)
NEUTROPHILS # BLD: 4.6 K/UL (ref 1.5–7.5)
NEUTS SEG NFR BLD: 68.6 % (ref 50–65)
OPIATES UR QL SCN: NEGATIVE
OXYCODONE UR QL SCN: NEGATIVE
PCP UR QL SCN: NEGATIVE
PLATELET # BLD AUTO: 194 K/UL (ref 130–400)
PMV BLD AUTO: 9.7 FL (ref 9.4–12.4)
POTASSIUM SERPL-SCNC: 4.1 MMOL/L (ref 3.5–5)
PROT SERPL-MCNC: 7.4 G/DL (ref 6.4–8.3)
RBC # BLD AUTO: 5.17 M/UL (ref 4.7–6.1)
SARS-COV-2 RDRP RESP QL NAA+PROBE: NOT DETECTED
SODIUM SERPL-SCNC: 141 MMOL/L (ref 136–145)
TRICYCLIC ANTIDEPRESSANTS, UR: NEGATIVE
WBC # BLD AUTO: 6.7 K/UL (ref 4.8–10.8)

## 2024-09-06 PROCEDURE — 82077 ASSAY SPEC XCP UR&BREATH IA: CPT

## 2024-09-06 PROCEDURE — 80307 DRUG TEST PRSMV CHEM ANLYZR: CPT

## 2024-09-06 PROCEDURE — 80053 COMPREHEN METABOLIC PANEL: CPT

## 2024-09-06 PROCEDURE — 6370000000 HC RX 637 (ALT 250 FOR IP): Performed by: PEDIATRICS

## 2024-09-06 PROCEDURE — 87635 SARS-COV-2 COVID-19 AMP PRB: CPT

## 2024-09-06 PROCEDURE — 85025 COMPLETE CBC W/AUTO DIFF WBC: CPT

## 2024-09-06 PROCEDURE — G0480 DRUG TEST DEF 1-7 CLASSES: HCPCS

## 2024-09-06 PROCEDURE — 36415 COLL VENOUS BLD VENIPUNCTURE: CPT

## 2024-09-06 PROCEDURE — 99283 EMERGENCY DEPT VISIT LOW MDM: CPT

## 2024-09-06 RX ADMIN — IBUPROFEN 600 MG: 200 TABLET, FILM COATED ORAL at 22:48

## 2024-09-07 VITALS
RESPIRATION RATE: 16 BRPM | OXYGEN SATURATION: 95 % | BODY MASS INDEX: 19.99 KG/M2 | SYSTOLIC BLOOD PRESSURE: 138 MMHG | HEART RATE: 78 BPM | TEMPERATURE: 98.1 F | DIASTOLIC BLOOD PRESSURE: 84 MMHG | WEIGHT: 135 LBS | HEIGHT: 69 IN

## 2024-09-07 NOTE — DISCHARGE INSTRUCTIONS
Return with thoughts of harming yourself, harming others, or other concerns.  Follow with your doctor as scheduled.

## 2024-09-07 NOTE — ED PROVIDER NOTES
[facility] EMERGENCY DEPT  eMERGENCY dEPARTMENT eNCOUnter      Pt Name: Yves Haile  MRN: [id number]  Birthdate 2000  Date of evaluation: 9/6/2024  Provider: Connie Hicks MD    CHIEF COMPLAINT       Chief Complaint   Patient presents with    Anxiety    Panic Attack     Worsening x2 weeks         HISTORY OF PRESENT ILLNESS   (Location/Symptom, Timing/Onset,Context/Setting, Quality, Duration, Modifying Factors, Severity)  Note limiting factors.   Yves Haile is a 23 y.o. male who presents to the emergency department with anxiety.  Patient has a history of unspecified mood disorder, autism and anxiety.  Patient has been seen inpatient behavioral health from 6/25/2024 to 7/1/2024.  Patient currently sees his counselor Jorge Alberto at [address] [place] [address].  Patient received a Haldol injection on 2 weeks ago.  Patient states that his anxiety has worsened.  Patient feels like he has been having \"a panic attack\" for the past 2 weeks.  Patient denies auditory or visual hallucinations.  Patient denies suicidal or homicidal ideation.  Patient denies alcohol use, drug use, or tobacco use.  Patient currently takes Risperdal and Atarax.  Patient has appointment for follow-up with his psychiatric provider on 9/10/2024.    HPI    NursingNotes were reviewed.    REVIEW OF SYSTEMS    (2-9 systems for level 4, 10 or more for level 5)     Review of Systems   Psychiatric/Behavioral:  Positive for agitation. Negative for hallucinations and suicidal ideas. The patient is nervous/anxious.    All other systems reviewed and are negative.           PAST MEDICALHISTORY   History reviewed. No pertinent past medical history.      SURGICAL HISTORY     History reviewed. No pertinent surgical history.      CURRENT MEDICATIONS     Discharge Medication List as of 9/7/2024 12:39 AM        CONTINUE these medications which have NOT CHANGED    Details   fluticasone (FLONASE) 50 MCG/ACT nasal spray 2 sprays by Each Nostril route 2 times daily as needed for Rhinitis,  below, none     Procedures    FINAL IMPRESSION    No diagnosis found.      DISPOSITION/PLAN   DISPOSITION    Condition at Disposition: Data Unavailable      PATIENT REFERRED TO:  No follow-up provider specified.    DISCHARGE MEDICATIONS:  New Prescriptions    No medications on file          (Please note that portions of this note were completed with a voice recognition program.  Efforts were made to edit thedictations but occasionally words are mis-transcribed.)    Connie Hicks MD (electronically signed)  Attending Emergency Physician          Connie Hicks MD  09/08/24 0956

## 2024-09-07 NOTE — PROGRESS NOTES
[facility] ADULT INITIAL INTAKE ASSESSMENT     9/6/24    Yves Haile ,a 23 y.o. male, presents to the ED for a psychiatric assessment.     ED Arrival time: 1812  ED physician:  Kurt  [facility] Notification time: In Er  [facility] Assessment start time: 2102  Psychiatrist call time: 2257  Spoke with Dr. Ibrahim    Patient is referred by: Walk in    Reason for visit to ED - Presenting problem:     PT states reason for ED visit, \"I have bad anxiety, get restless in my arms and just cannot focus on anything since I got my shot at [facility]\". Pt was admitted here for psychosis about 3 months ago, states he has been medication compliant since discharge. UDS negative, denies alcohol use, and does not smoke. He is cooperative with assessment, good eye contact noted, appears to be restless, walking back and forth in the room while answering questions. Per pt, a shot of paliperidone or haloperidone was given (at [facility]) as an alternative to one of his scheduled med, states medication has increased his anxiety. Step mother in the room with pt's permission, reports that pt recently relocated from [place], just started seeing a therapist, He does not like it here, isolates to himself more, but is doing well on meds, however, the shot worsened his anxiety. Has appt at [facility] on 9/10/24. Pt denies SI/HI/AVH at this time.    Duration of symptoms: Today    Current Stressors: family and health    C-SSRS Completed: yes  Risk Assessment Completed:yes  Risk of Suicide: No Risk  Provider notified of the C-SSRS Screening and Risk Assessment Findings:yes    SI:  denies   Plan: no   If yes, describe:n/a  Past SI attempts: no   If yes, when and how many times:n/a  Describe suicide attempts: n/a  HI: denies  If yes describe: n/a  Delusions: denies  If yes describe: n/a  Hallucinations: denies   If yes describe: n/a  Risk of Harm to self: Self injurious/self mutilation behaviorsno   If yes explain: n/a  Was it within the past 6  ED:no  Use of 02 or CPAP: no  Ambulatory: no  Independent or Need assistance with Self Care:     PCP: No primary care provider on file.     Current Medications:   Scheduled Meds: No current facility-administered medications for this encounter.    Current Outpatient Medications:     fluticasone (FLONASE) 50 MCG/ACT nasal spray, 2 sprays by Each Nostril route 2 times daily as needed for Rhinitis, Disp: 16 g, Rfl: 0    risperiDONE (RISPERDAL) 1 MG tablet, Take 1 tablet by mouth nightly, Disp: 30 tablet, Rfl: 0       Collateral Information:     Name: Betty Haile  Relationship:Step Mother  Phone Number:  [phone]  Collateral:     Safety Issues:    Weapons: The importance of locking weapons in a secured location or removing from home was explained and recommended to: Patient    Medications: The importance of medication management and locking extra medication in a secured location was explained and recommended to: Patient    Disposition:     Choose one of the options below for disposition:     1. Decision to admit to :no    Is patient voluntary: n/a  If no has a 72 hold been initiated: n/a  Admission Diagnosis:     Does the patient have a guardian or Medical POA: No  Has the guardian been notified or Medical POA:     2. Decision to Discharge:   Does not meet criteria for acceptance to   unit due to: No clinical indication to adx.at this time    3. Transferred:       Patient was transferred due to:     Other follow up information provided:        Safety Plan:     Safety Plan Completed: Yes    Provided a hard copy of safety plan to the patient:Yes    Explained how to follow the steps of the safety plan with patient:Yes    Discussed Facilitators and Barriers of the safety plan with patient: Yes        Amilcar Freitas RN